# Patient Record
Sex: MALE | Race: WHITE | NOT HISPANIC OR LATINO | Employment: OTHER | URBAN - METROPOLITAN AREA
[De-identification: names, ages, dates, MRNs, and addresses within clinical notes are randomized per-mention and may not be internally consistent; named-entity substitution may affect disease eponyms.]

---

## 2017-02-03 RX ORDER — INSULIN DETEMIR 100 [IU]/ML
INJECTION, SOLUTION SUBCUTANEOUS
Qty: 100 ML | Refills: 3 | Status: SHIPPED | OUTPATIENT
Start: 2017-02-03 | End: 2017-10-10 | Stop reason: DRUGHIGH

## 2017-02-13 ENCOUNTER — OFFICE VISIT (OUTPATIENT)
Dept: NEUROLOGY | Facility: CLINIC | Age: 72
End: 2017-02-13

## 2017-02-13 VITALS — WEIGHT: 315 LBS | BODY MASS INDEX: 36.45 KG/M2 | HEIGHT: 78 IN

## 2017-02-13 DIAGNOSIS — R51.9 CHRONIC INTRACTABLE HEADACHE, UNSPECIFIED HEADACHE TYPE: ICD-10-CM

## 2017-02-13 DIAGNOSIS — R41.3 MEMORY IMPAIRMENT: ICD-10-CM

## 2017-02-13 DIAGNOSIS — G89.29 CHRONIC INTRACTABLE HEADACHE, UNSPECIFIED HEADACHE TYPE: ICD-10-CM

## 2017-02-13 PROCEDURE — 99213 OFFICE O/P EST LOW 20 MIN: CPT | Performed by: PSYCHIATRY & NEUROLOGY

## 2017-02-13 NOTE — PROGRESS NOTES
Subjective   Dirk Atkins is a 71 y.o. male with history of memory impairment and headaches came for follow-up appointment.    History of Present Illness   He was accompanied by his wife and according to them, he has any changes in his memory and denies forgetting familiar names and patient is currently driving and denies any issues with driving but having depression issues and complaint with the medication citalopram and denies any side effects.  Denies any resting tremor, cogwheel rigidity, shuffling gait or hallucinations.  Complaining of headaches and happening 2-3 times a month, pain all over, dull pain, not associated with nausea, photophobia and phonophobia and complaint with the medication gabapentin and denies any side effects.  Denies any blurred vision, double vision, weakness, tingling numbness, dizziness or balance problems while walking.  Denies any falls, passing out spells or recent hospitalizations since last visit.    The following portions of the patient's history were reviewed and updated as appropriate: allergies, current medications, past family history, past medical history, past social history, past surgical history and problem list.    Review of Systems   Constitutional: Negative for chills, fatigue and fever.   HENT: Positive for hearing loss. Negative for tinnitus and trouble swallowing.    Eyes: Negative for pain, redness, itching and visual disturbance.   Respiratory: Positive for shortness of breath. Negative for wheezing.    Cardiovascular: Negative for chest pain and palpitations.   Gastrointestinal: Negative for constipation, diarrhea, nausea and vomiting.   Endocrine: Negative for cold intolerance and heat intolerance.   Genitourinary: Negative for decreased urine volume, difficulty urinating and urgency.   Musculoskeletal: Negative for neck pain and neck stiffness.   Skin: Negative for rash and wound.   Allergic/Immunologic: Negative for environmental allergies and food allergies.    Neurological: Positive for headaches. Negative for dizziness, weakness, light-headedness and numbness.   Hematological: Does not bruise/bleed easily.   Psychiatric/Behavioral: Negative for confusion, hallucinations and sleep disturbance.       Objective   Physical Exam   Vitals reviewed.    GENERAL EXAMINATION : Patient is well-developed, well-nourished, well-groomed, alert and approriate in no apparent distress.  HEENT: Normocephalic, normal funduscopic exam, no visible oral lesions.  NECK : Normal range of motion, no tenderness, no malalignment.  CARDIAC : Regular rate and rhythm, no murmurs.  CHEST : Clear to auscultation, bilateral.   No wheezing .  ABDOMEN : Soft, non tender and non distended. EXTREMITIES: No edema. SKIN : No rashes or lesions visible. MUSCULOSKELETAL : No muscle weakness or muscle pain. No joint pains. PSYCHIATRIC : Awake and follwoing verbal commands well.     NEUROLOGICAL EXAMINATION  :  Higher integrative functions : No aphasia or dysarthria.  MMSE 30/30.  CRANIAL NERVES : Cranial nerve II: Normal visual acuity and visual fields.  On funduscopic exam, discs are flat with sharp margins cranial nerves III, IV, VI: Extraocular movements are full without nystagmus; pupils are equal, round and reactive to light.  Cranial nerve V: Normal facial sensation and strength of muscles of mastication.  Cranial nerve: VII: Facial movements are symmetric.  No weakness.  Cranial nerve VIII: Auditory acuity is normal.  Cranial nerve IX, X: Symmetric palate movement.  Cranial nerve XI sternocleidomastoid and trapezius are normal.  Cranial nerve XII: Tongue in the midline with no atrophy or fasciculations.      MOTOR : Normal muscle strength and tone.  SENSATION : Normal to light touch, pinprick, vibration sensations intact and Romberg is negative.  MUSCLE STRETCH REFLEXES : Normal and symmetric in the upper extremities and lower extremities and the plantar responses are flexor bilaterally. COORDINATION :  Finger to nose test showed no dysmetria.  Rapid alternating movements are normal.   GAIT : Walks on heels, toes, except for difficulty with tandem walk.    Assessment/Plan   Dirk was seen today for memory loss.    Diagnoses and all orders for this visit:    Memory impairment    Chronic intractable headache, unspecified headache type     71-year-old right-handed white male with history of memory impairment with headaches came for follow-up appointment.  On exam, no new focal deficits were seen except for difficulty with tandem walk and MMSE 30/30.  Reviewed neuropsychological evaluation dated June 2016 and did not any evidence of dementia but showed major depression and discussed with the patient and his wife regarding his signs and symptoms and told him to continue vitamin B12 at the same dosage and discussed about side effects.  Discussed about brain exercises to help with the memory issues.  Told him to continue citalopram to help with the depression issues.  Continue gabapentin 300 mg by mouth 3 times a day and discussed about side effects.  Reviewed sleep study and showed evidence of mild issues but denies any major sleep apnea.  Discussed about medication or use headaches and told him not to use over-the-counter medication as it can cause rebound headaches.  Discussed about fall precautions and told him to continue physical activity and will consider physical therapy in future for worsening of balance issues.  Will follow-up in 6 months or earlier if problems arise.    Thank you for allowing me to participate in the care of the patient.  Please feel free to call for any questions at your convenience.    Yours sincerely,    Genie Mendoza M.D

## 2017-02-23 RX ORDER — SIMVASTATIN 40 MG
TABLET ORAL
Qty: 90 TABLET | Refills: 0 | Status: SHIPPED | OUTPATIENT
Start: 2017-02-23 | End: 2017-04-27 | Stop reason: SDUPTHER

## 2017-02-24 DIAGNOSIS — N40.0 ENLARGED PROSTATE WITHOUT LOWER URINARY TRACT SYMPTOMS (LUTS): ICD-10-CM

## 2017-02-24 RX ORDER — TAMSULOSIN HYDROCHLORIDE 0.4 MG/1
1 CAPSULE ORAL DAILY
Qty: 90 CAPSULE | Refills: 0 | Status: SHIPPED | OUTPATIENT
Start: 2017-02-24 | End: 2017-04-28 | Stop reason: SDUPTHER

## 2017-03-20 ENCOUNTER — OFFICE VISIT (OUTPATIENT)
Dept: INTERNAL MEDICINE | Age: 72
End: 2017-03-20

## 2017-03-20 VITALS
BODY MASS INDEX: 36.45 KG/M2 | DIASTOLIC BLOOD PRESSURE: 60 MMHG | RESPIRATION RATE: 12 BRPM | SYSTOLIC BLOOD PRESSURE: 110 MMHG | HEART RATE: 72 BPM | WEIGHT: 315 LBS | HEIGHT: 78 IN

## 2017-03-20 DIAGNOSIS — R35.1 NOCTURIA ASSOCIATED WITH BENIGN PROSTATIC HYPERTROPHY: ICD-10-CM

## 2017-03-20 DIAGNOSIS — N40.1 NOCTURIA ASSOCIATED WITH BENIGN PROSTATIC HYPERTROPHY: ICD-10-CM

## 2017-03-20 DIAGNOSIS — E11.9 TYPE 2 DIABETES MELLITUS WITHOUT COMPLICATION, WITHOUT LONG-TERM CURRENT USE OF INSULIN (HCC): ICD-10-CM

## 2017-03-20 DIAGNOSIS — I10 ESSENTIAL HYPERTENSION: Primary | ICD-10-CM

## 2017-03-20 DIAGNOSIS — E78.2 MIXED HYPERLIPIDEMIA: ICD-10-CM

## 2017-03-20 DIAGNOSIS — E53.8 B12 DEFICIENCY: ICD-10-CM

## 2017-03-20 LAB
ALBUMIN SERPL-MCNC: 4.7 G/DL (ref 3.5–5.2)
ALBUMIN/GLOB SERPL: 2.1 G/DL
ALP SERPL-CCNC: 72 U/L (ref 39–117)
ALT SERPL-CCNC: 13 U/L (ref 1–41)
AST SERPL-CCNC: 14 U/L (ref 1–40)
BILIRUB SERPL-MCNC: 0.7 MG/DL (ref 0.1–1.2)
BUN SERPL-MCNC: 10 MG/DL (ref 8–23)
BUN/CREAT SERPL: 10.8 (ref 7–25)
CALCIUM SERPL-MCNC: 9.7 MG/DL (ref 8.6–10.5)
CHLORIDE SERPL-SCNC: 97 MMOL/L (ref 98–107)
CHOLEST SERPL-MCNC: 153 MG/DL (ref 0–200)
CO2 SERPL-SCNC: 22.9 MMOL/L (ref 22–29)
CREAT SERPL-MCNC: 0.93 MG/DL (ref 0.76–1.27)
GLOBULIN SER CALC-MCNC: 2.2 GM/DL
GLUCOSE SERPL-MCNC: 209 MG/DL (ref 65–99)
HBA1C MFR BLD: 8.2 % (ref 4.8–5.6)
HDLC SERPL-MCNC: 30 MG/DL (ref 40–60)
LDLC SERPL CALC-MCNC: 85 MG/DL (ref 0–100)
POTASSIUM SERPL-SCNC: 5.3 MMOL/L (ref 3.5–5.2)
PROT SERPL-MCNC: 6.9 G/DL (ref 6–8.5)
PSA SERPL-MCNC: 0.06 NG/ML (ref 0–4)
SODIUM SERPL-SCNC: 135 MMOL/L (ref 136–145)
TRIGL SERPL-MCNC: 189 MG/DL (ref 0–150)
VIT B12 SERPL-MCNC: 1259 PG/ML (ref 211–946)
VLDLC SERPL CALC-MCNC: 37.8 MG/DL (ref 5–40)

## 2017-03-20 PROCEDURE — 99214 OFFICE O/P EST MOD 30 MIN: CPT | Performed by: INTERNAL MEDICINE

## 2017-03-20 NOTE — PROGRESS NOTES
Dirk Atkins is a 71 y.o. male who presents with   Chief Complaint   Patient presents with   • Hypertension     Checkup; lab updates   • Hyperlipidemia     As above   • Diabetes     As above.  Patient's weight has increased from 334 pounds in February to his current weight of 343 pounds.  At his height of 6 feet 6 it is estimated his ideal weight is somewhere around 225 pounds.   • B12 deficiency   .    Hypertension   This is a chronic problem. The current episode started more than 1 year ago. The problem is controlled. Past treatments include beta blockers and ACE inhibitors. The current treatment provides moderate improvement. There are no compliance problems.    Hyperlipidemia   This is a chronic problem. The current episode started more than 1 year ago. Recent lipid tests were reviewed and are high (Elevation of triglycerides from recent past labs.). Current antihyperlipidemic treatment includes statins. The current treatment provides moderate improvement of lipids. There are no compliance problems.    Diabetes   He presents for his follow-up diabetic visit. He has type 2 diabetes mellitus. His disease course has been stable. There are no hypoglycemic complications. Symptoms are stable. There are no diabetic complications. He does not see a podiatrist (No symptoms of diabetic peripheral neuropathy.).Eye exam current: Has an appointment at the [facility] in the next several days.        The following portions of the patient's history were reviewed and updated as appropriate: allergies, current medications, past medical history and problem list.    Review of Systems   Constitutional: Negative.    HENT: Negative.    Eyes: Negative.    Respiratory: Negative.    Cardiovascular: Negative.    Genitourinary: Negative.    Musculoskeletal: Negative.    Skin: Negative.    Neurological: Negative.    Psychiatric/Behavioral: Negative.        Objective   Physical Exam   Constitutional: He is oriented to person,  place, and time. He appears well-developed and well-nourished. No distress.   HENT:   Head: Normocephalic and atraumatic.   Eyes: Conjunctivae and EOM are normal. Pupils are equal, round, and reactive to light.   Neck: Normal range of motion. Neck supple. No thyromegaly present.   Neck exam negative.  Carotid auscultation normal-no bruits heard.   Cardiovascular: Normal rate, regular rhythm, normal heart sounds and intact distal pulses.  Exam reveals no gallop and no friction rub.    No murmur heard.  Pulmonary/Chest: Effort normal and breath sounds normal. No respiratory distress. He has no wheezes. He has no rales. He exhibits no tenderness.   Neurological: He is alert and oriented to person, place, and time.   Psychiatric: He has a normal mood and affect. His behavior is normal. Judgment and thought content normal.   Nursing note and vitals reviewed.      Assessment/Plan   Dirk was seen today for hypertension, hyperlipidemia, diabetes and b12 deficiency.    Diagnoses and all orders for this visit:    Essential hypertension  -     Comprehensive Metabolic Panel    Mixed hyperlipidemia  -     Comprehensive Metabolic Panel  -     Lipid Panel    Type 2 diabetes mellitus without complication, without long-term current use of insulin  -     Comprehensive Metabolic Panel  -     Hemoglobin A1c    B12 deficiency  -     Vitamin B12    Nocturia associated with benign prostatic hypertrophy  -     PSA        Plan: Labs as above.Today's exam unremarkable for any new problems or events.  Continue all current treatment as prescribed.  A follow-up checkup/lab update visit is advised for 6 months.

## 2017-04-24 DIAGNOSIS — E11.9 TYPE 2 DIABETES MELLITUS WITHOUT COMPLICATION, WITHOUT LONG-TERM CURRENT USE OF INSULIN (HCC): Primary | ICD-10-CM

## 2017-04-24 RX ORDER — BLOOD-GLUCOSE METER
EACH MISCELLANEOUS
Qty: 1 EACH | Refills: 0 | Status: SHIPPED | OUTPATIENT
Start: 2017-04-24 | End: 2018-01-22 | Stop reason: CLARIF

## 2017-04-24 RX ORDER — BLOOD SUGAR DIAGNOSTIC
STRIP MISCELLANEOUS
Qty: 200 EACH | Refills: 12 | Status: SHIPPED | OUTPATIENT
Start: 2017-04-24 | End: 2018-01-22 | Stop reason: CLARIF

## 2017-04-27 DIAGNOSIS — E78.2 MIXED HYPERLIPIDEMIA: Primary | ICD-10-CM

## 2017-04-27 RX ORDER — SIMVASTATIN 40 MG
40 TABLET ORAL NIGHTLY
Qty: 90 TABLET | Refills: 1 | Status: SHIPPED | OUTPATIENT
Start: 2017-04-27 | End: 2018-01-02 | Stop reason: SDUPTHER

## 2017-04-28 DIAGNOSIS — N40.0 ENLARGED PROSTATE WITHOUT LOWER URINARY TRACT SYMPTOMS (LUTS): ICD-10-CM

## 2017-04-28 RX ORDER — TAMSULOSIN HYDROCHLORIDE 0.4 MG/1
CAPSULE ORAL
Qty: 90 CAPSULE | Refills: 1 | Status: SHIPPED | OUTPATIENT
Start: 2017-04-28 | End: 2018-01-09 | Stop reason: SDUPTHER

## 2017-06-02 RX ORDER — FINASTERIDE 5 MG/1
TABLET, FILM COATED ORAL
Qty: 90 TABLET | Refills: 1 | Status: SHIPPED | OUTPATIENT
Start: 2017-06-02 | End: 2017-06-05 | Stop reason: SDUPTHER

## 2017-06-06 RX ORDER — FINASTERIDE 5 MG/1
TABLET, FILM COATED ORAL
Qty: 90 TABLET | Refills: 1 | Status: SHIPPED | OUTPATIENT
Start: 2017-06-06 | End: 2018-02-10 | Stop reason: SDUPTHER

## 2017-06-09 ENCOUNTER — TELEPHONE (OUTPATIENT)
Dept: INTERNAL MEDICINE | Age: 72
End: 2017-06-09

## 2017-06-09 DIAGNOSIS — F39 MOOD DISORDER (HCC): ICD-10-CM

## 2017-06-09 RX ORDER — CITALOPRAM 20 MG/1
20 TABLET ORAL DAILY
Qty: 90 TABLET | Refills: 0 | Status: SHIPPED | OUTPATIENT
Start: 2017-06-09 | End: 2017-10-17 | Stop reason: SDUPTHER

## 2017-06-09 NOTE — TELEPHONE ENCOUNTER
Pt  Called requesting a rx to be sent to [facility] mail order for Citalopram 20mg 1 QD qty 90, with 3 refills.

## 2017-07-10 DIAGNOSIS — I10 ESSENTIAL HYPERTENSION: ICD-10-CM

## 2017-09-12 DIAGNOSIS — I10 ESSENTIAL HYPERTENSION: ICD-10-CM

## 2017-09-14 ENCOUNTER — OFFICE VISIT (OUTPATIENT)
Dept: INTERNAL MEDICINE | Age: 72
End: 2017-09-14

## 2017-09-14 VITALS
RESPIRATION RATE: 12 BRPM | HEART RATE: 70 BPM | DIASTOLIC BLOOD PRESSURE: 60 MMHG | BODY MASS INDEX: 36.45 KG/M2 | WEIGHT: 315 LBS | SYSTOLIC BLOOD PRESSURE: 120 MMHG | HEIGHT: 78 IN

## 2017-09-14 DIAGNOSIS — E11.9 TYPE 2 DIABETES MELLITUS WITHOUT COMPLICATION, WITHOUT LONG-TERM CURRENT USE OF INSULIN (HCC): Primary | ICD-10-CM

## 2017-09-14 DIAGNOSIS — I10 ESSENTIAL HYPERTENSION: ICD-10-CM

## 2017-09-14 DIAGNOSIS — E78.2 MIXED HYPERLIPIDEMIA: ICD-10-CM

## 2017-09-14 PROCEDURE — 99214 OFFICE O/P EST MOD 30 MIN: CPT | Performed by: INTERNAL MEDICINE

## 2017-09-14 NOTE — PROGRESS NOTES
Dirk Atkins is a 72 y.o. male who presents with   Chief Complaint   Patient presents with   • Diabetes     Checkup; lab updates.  Patient reports that his blood sugars consistently are between 200-275 on his current doses of medication including insulin.   • Hypertension     Checkup; lab updates   • Hyperlipidemia     As above.   .    Diabetes   He presents for his follow-up diabetic visit. He has type 2 diabetes mellitus. His disease course has been stable. There are no hypoglycemic associated symptoms. There are no diabetic associated symptoms. There are no hypoglycemic complications. There are no diabetic complications. Current diabetic treatment includes insulin injections and oral agent (monotherapy). He is compliant with treatment most of the time. His weight is increasing steadily. When asked about meal planning, he reported none. There is no change in his home blood glucose trend. His breakfast blood glucose range is generally >200 mg/dl. His dinner blood glucose range is generally >200 mg/dl. An ACE inhibitor/angiotensin II receptor blocker is being taken. Eye exam is current.   Hypertension   This is a chronic problem. The current episode started more than 1 year ago. The problem is controlled. Past treatments include beta blockers and ACE inhibitors. The current treatment provides moderate improvement. There are no compliance problems.    Hyperlipidemia   This is a chronic problem. The current episode started more than 1 year ago. The problem is controlled. Current antihyperlipidemic treatment includes statins. The current treatment provides moderate improvement of lipids. There are no compliance problems.         The following portions of the patient's history were reviewed and updated as appropriate: allergies, current medications, past medical history and problem list.    Review of Systems   Constitutional: Negative.    HENT: Negative.    Eyes: Negative.    Respiratory: Negative.    Cardiovascular:  Negative.    Genitourinary: Negative.    Musculoskeletal: Negative.    Skin: Negative.    Neurological: Negative.    Psychiatric/Behavioral: Negative.        Objective   Physical Exam   Constitutional: He is oriented to person, place, and time. He appears well-developed and well-nourished. No distress.   Obese 72-year-old male whose weight has increased from 334 pounds in February to his current weight of 349 pounds.  He does not seem too terribly concerned about weight loss.  Also it is noted that his labs in March showed a blood sugar of 209 with an A1c of 8.2.  He was advised after that visit and after his labs were seen that he should come in to discuss those lab abnormalities and to decide on a course of action.  He never did comply with that advice.   HENT:   Head: Normocephalic and atraumatic.   Eyes: Conjunctivae and EOM are normal. Pupils are equal, round, and reactive to light.   Neck: Normal range of motion. Neck supple. No thyromegaly present.   Neck exam negative.  Carotid auscultation normal-no bruits heard.   Cardiovascular: Normal rate, regular rhythm, normal heart sounds and intact distal pulses.  Exam reveals no gallop and no friction rub.    No murmur heard.  Pulmonary/Chest: Effort normal and breath sounds normal. No respiratory distress. He has no wheezes. He has no rales. He exhibits no tenderness.   Neurological: He is alert and oriented to person, place, and time.   Psychiatric: He has a normal mood and affect. His behavior is normal. Judgment and thought content normal.   Nursing note and vitals reviewed.      Assessment/Plan   Dirk was seen today for diabetes, hypertension and hyperlipidemia.    Diagnoses and all orders for this visit:    Type 2 diabetes mellitus without complication, without long-term current use of insulin  -     Comprehensive Metabolic Panel  -     Hemoglobin A1c  -     TSH+Free T4  -     MicroAlbumin, Urine, Random    Mixed hyperlipidemia  -     Comprehensive  Metabolic Panel  -     Lipid Panel  -     TSH+Free T4    Essential hypertension  -     Comprehensive Metabolic Panel  -     TSH+Free T4      Plan: Weight loss strongly advised so as to help his medications and his insulin work better for him to lower his blood sugar.    Diabetes: He will increase his morning dose of Levemir to 30 units at 7 AM.  He will continue his 7 PM dose of Levemir at 40 units.  He will continue to monitor his blood sugars at home and we will see him in 3 weeks for a reevaluation of his blood sugar.  All of his other medications as listed will be continued as they are for now.    Hypertension/hyperlipidemia: Continue current treatment.  Tentatively we will plan on a six-month checkup/lab update visit to assess these and the rest of his labs but much of this will depend on what his blood sugar does with the increased dose of insulin as above.

## 2017-09-15 LAB
ALBUMIN SERPL-MCNC: 4.4 G/DL (ref 3.5–5.2)
ALBUMIN/GLOB SERPL: 2.1 G/DL
ALP SERPL-CCNC: 62 U/L (ref 39–117)
ALT SERPL-CCNC: 14 U/L (ref 1–41)
AST SERPL-CCNC: 12 U/L (ref 1–40)
BILIRUB SERPL-MCNC: 0.8 MG/DL (ref 0.1–1.2)
BUN SERPL-MCNC: 13 MG/DL (ref 8–23)
BUN/CREAT SERPL: 13.5 (ref 7–25)
CALCIUM SERPL-MCNC: 9.4 MG/DL (ref 8.6–10.5)
CHLORIDE SERPL-SCNC: 102 MMOL/L (ref 98–107)
CHOLEST SERPL-MCNC: 140 MG/DL (ref 0–200)
CO2 SERPL-SCNC: 28.7 MMOL/L (ref 22–29)
CREAT SERPL-MCNC: 0.96 MG/DL (ref 0.76–1.27)
GLOBULIN SER CALC-MCNC: 2.1 GM/DL
GLUCOSE SERPL-MCNC: 167 MG/DL (ref 65–99)
HBA1C MFR BLD: 7.79 % (ref 4.8–5.6)
HDLC SERPL-MCNC: 33 MG/DL (ref 40–60)
LDLC SERPL CALC-MCNC: 74 MG/DL (ref 0–100)
MICROALBUMIN UR-MCNC: 88.5 UG/ML
POTASSIUM SERPL-SCNC: 5.3 MMOL/L (ref 3.5–5.2)
PROT SERPL-MCNC: 6.5 G/DL (ref 6–8.5)
SODIUM SERPL-SCNC: 141 MMOL/L (ref 136–145)
T4 FREE SERPL-MCNC: 0.97 NG/DL (ref 0.93–1.7)
TRIGL SERPL-MCNC: 164 MG/DL (ref 0–150)
TSH SERPL DL<=0.005 MIU/L-ACNC: 1.04 MIU/ML (ref 0.27–4.2)
VLDLC SERPL CALC-MCNC: 32.8 MG/DL (ref 5–40)

## 2017-10-10 ENCOUNTER — OFFICE VISIT (OUTPATIENT)
Dept: INTERNAL MEDICINE | Age: 72
End: 2017-10-10

## 2017-10-10 VITALS
SYSTOLIC BLOOD PRESSURE: 110 MMHG | DIASTOLIC BLOOD PRESSURE: 60 MMHG | WEIGHT: 315 LBS | HEART RATE: 72 BPM | HEIGHT: 78 IN | RESPIRATION RATE: 12 BRPM | BODY MASS INDEX: 36.45 KG/M2

## 2017-10-10 DIAGNOSIS — E11.9 TYPE 2 DIABETES MELLITUS WITHOUT COMPLICATION, UNSPECIFIED LONG TERM INSULIN USE STATUS: Primary | ICD-10-CM

## 2017-10-10 PROCEDURE — 99214 OFFICE O/P EST MOD 30 MIN: CPT | Performed by: INTERNAL MEDICINE

## 2017-10-10 NOTE — PROGRESS NOTES
"  Dirk Atkins is a 72 y.o. male who presents with   Chief Complaint   Patient presents with   • Diabetes     Currently using Levemir 30 units in the morning before breakfast and 40 units in the evening before supper.  His morning blood sugars are ranging between 130-200; his 4 hour post lunch blood sugars range between 140-330; and at suppertime his blood sugars range between 120-240.  The morning average is 195.  The lunchtime average is 223.  The evening average is 173.  His weight has increased from 343 pounds in March to 356 pounds now.  He is admittedly noncompliant with diet and exercise and admits that    .  Continuing the dictation from above.. and he admits that he is not likely to become compliant with either one (diet and exercise).  He realizes that diabetes is a potentially fatal problem and laughingly admits that Dr. Owusu has told him in the past that he is \"killing himself with a knife and fork\".    Diabetes   He presents for his follow-up diabetic visit. He has type 2 diabetes mellitus. His disease course has been stable. There are no hypoglycemic associated symptoms. There are no diabetic associated symptoms. There are no hypoglycemic complications. Current diabetic treatment includes oral agent (monotherapy) and insulin injections. He is compliant with treatment most of the time. His weight is increasing rapidly. He is following a generally unhealthy diet. When asked about meal planning, he reported none. Prior visit with dietitian: Patient is not interested.  He says his wife is an RN and \"gets on me all the time about eating poorly and not exercising\" He never participates in exercise. His home blood glucose trend is fluctuating dramatically. An ACE inhibitor/angiotensin II receptor blocker is being taken. Sees podiatrist: Declines.  Says he does not have any symptoms of tingling in his feet.Eye exam is current.        The following portions of the patient's history were reviewed and updated " as appropriate: allergies, current medications, past medical history and problem list.    Review of Systems   Constitutional: Negative.    HENT: Negative.    Eyes: Negative.    Respiratory: Negative.    Cardiovascular: Negative.    Genitourinary: Negative.    Musculoskeletal: Negative.    Skin: Negative.    Neurological: Negative.    Psychiatric/Behavioral: Negative.        Objective   Physical Exam   Constitutional: He is oriented to person, place, and time. He appears well-developed and well-nourished. No distress.   Morbid obesity as noted.  The patient has no inclination is to lose weight although I did mention to him that he might strongly consider the weight watchers program.   HENT:   Head: Normocephalic and atraumatic.   Eyes: Conjunctivae and EOM are normal. Pupils are equal, round, and reactive to light.   Neck: Normal range of motion. Neck supple. No thyromegaly present.   Neck exam negative.  Carotid auscultation normal-no bruits heard.   Cardiovascular: Normal rate, regular rhythm, normal heart sounds and intact distal pulses.  Exam reveals no gallop and no friction rub.    No murmur heard.  Pulmonary/Chest: Effort normal and breath sounds normal. No respiratory distress. He has no wheezes. He has no rales. He exhibits no tenderness.   Neurological: He is alert and oriented to person, place, and time.   Psychiatric: He has a normal mood and affect. His behavior is normal. Judgment and thought content normal.   Nursing note and vitals reviewed.      Assessment/Plan   Dirk was seen today for diabetes.    Diagnoses and all orders for this visit:    Type 2 diabetes mellitus without complication, unspecified long term insulin use status      Plan: The patient's noncompliance with diet/exercise makes it very difficult to get any semblance of diabetic control.  We will try increasing his Levemir to 40 units in the morning before breakfast and will continue his 40 units in the evening before supper.  All  other medications will he continued as prescribed.  A follow-up checkup to review blood sugars has been advised for one month.

## 2017-10-17 DIAGNOSIS — I10 ESSENTIAL HYPERTENSION: ICD-10-CM

## 2017-10-17 DIAGNOSIS — F39 MOOD DISORDER (HCC): ICD-10-CM

## 2017-10-17 DIAGNOSIS — E11.9 TYPE 2 DIABETES MELLITUS WITHOUT COMPLICATION, UNSPECIFIED LONG TERM INSULIN USE STATUS: Primary | ICD-10-CM

## 2017-10-17 RX ORDER — CITALOPRAM 20 MG/1
20 TABLET ORAL DAILY
Qty: 90 TABLET | Refills: 0 | Status: SHIPPED | OUTPATIENT
Start: 2017-10-17 | End: 2018-01-09 | Stop reason: SDUPTHER

## 2017-10-23 DIAGNOSIS — I10 ESSENTIAL HYPERTENSION: ICD-10-CM

## 2017-10-23 DIAGNOSIS — I25.10 CORONARY ARTERIOSCLEROSIS: ICD-10-CM

## 2017-10-23 RX ORDER — ISOSORBIDE MONONITRATE 30 MG/1
TABLET, EXTENDED RELEASE ORAL
Qty: 90 TABLET | Refills: 0 | Status: SHIPPED | OUTPATIENT
Start: 2017-10-23 | End: 2018-02-10 | Stop reason: SDUPTHER

## 2017-11-10 ENCOUNTER — OFFICE VISIT (OUTPATIENT)
Dept: INTERNAL MEDICINE | Age: 72
End: 2017-11-10

## 2017-11-10 VITALS
WEIGHT: 315 LBS | BODY MASS INDEX: 36.45 KG/M2 | HEART RATE: 70 BPM | HEIGHT: 78 IN | OXYGEN SATURATION: 94 % | TEMPERATURE: 95.6 F | SYSTOLIC BLOOD PRESSURE: 110 MMHG | DIASTOLIC BLOOD PRESSURE: 60 MMHG | RESPIRATION RATE: 12 BRPM

## 2017-11-10 DIAGNOSIS — I10 ESSENTIAL HYPERTENSION: ICD-10-CM

## 2017-11-10 DIAGNOSIS — E11.9 TYPE 2 DIABETES MELLITUS WITHOUT COMPLICATION, UNSPECIFIED LONG TERM INSULIN USE STATUS: Primary | ICD-10-CM

## 2017-11-10 PROCEDURE — 99214 OFFICE O/P EST MOD 30 MIN: CPT | Performed by: INTERNAL MEDICINE

## 2017-11-10 NOTE — PROGRESS NOTES
"  Dirk Atkins is a 72 y.o. male who presents with   Chief Complaint   Patient presents with   • Diabetes     Patient returns for follow-up blood sugar reevaluation after having increased his Levemir to 40 units twice a day.  He continues to take metformin 850 mg twice a day.  His blood sugars are ranging at home between 120-300 consistently from his morning to evening readings.  He also says that on occasion \"when I eat I pass out\".   • Hypertension     Check blood pressure.   .    Diabetes   He presents for his follow-up diabetic visit. He has type 2 diabetes mellitus. His disease course has been fluctuating. (Patient reports that on occasion he \"passes out after eating\".) Hypoglycemia complications include blackouts. Current diabetic treatment includes oral agent (monotherapy) and insulin injections. He is compliant with treatment most of the time. His weight is decreasing steadily. He is following a generally unhealthy diet. When asked about meal planning, he reported none. He has not had a previous visit with a dietitian. He never participates in exercise. An ACE inhibitor/angiotensin II receptor blocker is being taken.   Hypertension   This is a chronic problem. The current episode started more than 1 year ago. The problem is controlled. Past treatments include beta blockers and ACE inhibitors. The current treatment provides moderate improvement. There are no compliance problems.         The following portions of the patient's history were reviewed and updated as appropriate: allergies, current medications, past medical history and problem list.    Review of Systems   Constitutional: Negative.    HENT: Negative.    Eyes: Negative.    Respiratory: Negative.    Cardiovascular: Negative.    Genitourinary: Negative.    Musculoskeletal: Negative.    Skin: Negative.    Neurological: Negative.    Psychiatric/Behavioral: Negative.        Objective   Physical Exam   Constitutional: He is oriented to person, place, and " "time. He appears well-developed and well-nourished. No distress.   HENT:   Head: Normocephalic and atraumatic.   Eyes: Conjunctivae and EOM are normal. Pupils are equal, round, and reactive to light.   Neck: Normal range of motion. Neck supple. No thyromegaly present.   Neck exam negative.  Carotid auscultation normal-no bruits heard.   Cardiovascular: Normal rate, regular rhythm, normal heart sounds and intact distal pulses.  Exam reveals no gallop and no friction rub.    No murmur heard.  Pulmonary/Chest: Effort normal and breath sounds normal. No respiratory distress. He has no wheezes. He has no rales. He exhibits no tenderness.   Neurological: He is alert and oriented to person, place, and time.   Psychiatric: He has a normal mood and affect. His behavior is normal. Judgment and thought content normal.   Nursing note and vitals reviewed.      Assessment/Plan   Dirk was seen today for diabetes and hypertension.    Diagnoses and all orders for this visit:    Type 2 diabetes mellitus without complication, unspecified long term insulin use status  -     Ambulatory Referral to Endocrinology    Essential hypertension      Plan:  #1 diabetes: Poorly controlled diabetes on current treatment.  Patient is admittedly noncompliant with any dietary efforts and states that he is \"not likely to change my diet\".  At this point I feel referral to endocrinology is in order and hopefully better control can be achieved.  Patient advised that future lab testing for sugar, lipids etc. likely will be done through endocrinology and that he may not need to have the frequency of lab testings through my office that he has had in the past.    #2 hypertension: Blood pressure under reasonably good control with his current treatment.  Continue the same.  Follow-up blood pressure checkup in 4 months advised.         "

## 2017-11-16 ENCOUNTER — TELEPHONE (OUTPATIENT)
Dept: INTERNAL MEDICINE | Age: 72
End: 2017-11-16

## 2017-11-16 NOTE — TELEPHONE ENCOUNTER
"Agree with the assessment.  It is difficult to tell whether he has \"the flu\" or whether he might have pneumonia with flulike symptoms.  Given his overall medical condition I feel an ER evaluation would be most appropriate under these circumstances.  "

## 2017-11-16 NOTE — TELEPHONE ENCOUNTER
----- Message from Yony Hyman sent at 11/16/2017  8:40 AM EST -----  Wife called and said that her  has the flu, he has had it for about a week, she called in this morning around 8:40AM and wanted him to be seen by Dr Tyler.  I ask her his symptoms and she said he had chillis, very congested and he has heart problems, but he had a hard time breathing last night.  I spoke with Angie and she advised that he needed to go to the ER.  I relayed the msg back to the wife and she question why he needed to go to the ER.  I explained that a chest x ray may need to be done as well as he may need fluids.

## 2017-11-20 DIAGNOSIS — I10 ESSENTIAL HYPERTENSION: ICD-10-CM

## 2017-11-29 ENCOUNTER — OFFICE VISIT (OUTPATIENT)
Dept: ENDOCRINOLOGY | Age: 72
End: 2017-11-29

## 2017-11-29 VITALS — HEIGHT: 78 IN | WEIGHT: 315 LBS | BODY MASS INDEX: 36.45 KG/M2

## 2017-11-29 DIAGNOSIS — E67.3 HYPERVITAMINOSIS D: ICD-10-CM

## 2017-11-29 DIAGNOSIS — R53.82 CHRONIC FATIGUE: ICD-10-CM

## 2017-11-29 DIAGNOSIS — Z79.4 TYPE 2 DIABETES MELLITUS WITH HYPERGLYCEMIA, WITH LONG-TERM CURRENT USE OF INSULIN (HCC): Primary | ICD-10-CM

## 2017-11-29 DIAGNOSIS — R41.3 MEMORY IMPAIRMENT: ICD-10-CM

## 2017-11-29 DIAGNOSIS — E78.2 MIXED HYPERLIPIDEMIA: ICD-10-CM

## 2017-11-29 DIAGNOSIS — E11.65 TYPE 2 DIABETES MELLITUS WITH HYPERGLYCEMIA, WITH LONG-TERM CURRENT USE OF INSULIN (HCC): Primary | ICD-10-CM

## 2017-11-29 DIAGNOSIS — I10 ESSENTIAL HYPERTENSION: ICD-10-CM

## 2017-11-29 PROCEDURE — 99204 OFFICE O/P NEW MOD 45 MIN: CPT | Performed by: NURSE PRACTITIONER

## 2017-11-29 NOTE — PATIENT INSTRUCTIONS
Stop Metformin 850 mg twice daily and stop Levemir 40 units twice daily    Start Tresiba U200 insulin at 60 units in the morning increase 1 unit daily if fasting blood glucoses over 110 mg/Brittany    Start Victoza 18 mg/3 ML titrate as instructed 1 injection daily    Start Synjardy 5/1000mg  by mouth twice daily with this medication take over-the-counter vitamin C at least 500 mg daily and vitamin D at least at thousand IUs daily increase water at least 3-4 bottles daily especially the first 3-4 weeks while starting this medication    home blood tests -  Blood glucose testin times daily, that are: fasting- 1st thing in morning before eating or drinking, before each meal and 1 or 2 hours after meal  Bedtime, anytime you feel symptoms of hyperglycemia or hypoglycemia (high or low blood sugars)

## 2017-11-29 NOTE — PROGRESS NOTES
Dirk Atkins who presents for for evaluation and treatment of Type 2 diabetes mellitus insulin dependent. The initial diagnosis of diabetes was made 13 years ago.     The condition of diabetes location is system with the course being clinical course has fluctuated , the severity is Moderate , and the modifying/allievating factors are  insulin. Insulin dosage review with Dirk Atkins suggested compliance most of the time   .       Associated symptoms of hyperglycemia have been pass out.  Associated symptoms of hypoglycemia have been none. Patient reports  hypoglycemia threshold for symptoms is n/a mg/dl .       The patient is currently taking home blood tests - Blood glucose testing: 3 times daily, that are:  fasting- 1st thing in morning before eating or drinking  before lunch and after supper   basal insulIn  Levemir U100 40 units in AM  Levemir U100 40 units in PM     Compliance with blood glucose monitoring: good.     Exercise:intermittently with meal panning: The patient is using no plan.    Home blood glucose testing daily: 3  FB to 196 mg/dl  before lunch 127 to 273 mg/dl   after dinner/supper 130 to 253 mg/dl    Patterns reported per patient are that he doesn't feel well because his BS are fluctuating so greatly. After meals - sleepy/pass out and BG goes to 250mg/dl or higher.       The following portions of the patient's history were reviewed and updated as appropriate: allergies, current medications, past family history, past medical history, past social history, past surgical history and problem list.        Medical records, chart, and labs reviewed from primary care provider.      Current Outpatient Prescriptions:   •  aspirin 325 MG tablet, Take 325 mg by mouth daily., Disp: , Rfl:   •  Blood Glucose Calibration (ACCU-CHEK SMARTVIEW CONTROL VI), 2 (two) times a day., Disp: , Rfl:   •  Blood Glucose Monitoring Suppl (PRODIGY AUTOCODE BLOOD GLUCOSE) W/DEVICE kit, Use to test blood sugar twice  daily., Disp: 1 each, Rfl: 0  •  citalopram (CeleXA) 20 MG tablet, Take 1 tablet by mouth Daily., Disp: 90 tablet, Rfl: 0  •  finasteride (PROSCAR) 5 MG tablet, TAKE 1 TABLET BY MOUTH DAILY., Disp: 90 tablet, Rfl: 1  •  insulin detemir (LEVEMIR) 100 UNIT/ML injection, Take 40 units before breakfast and 40 units before supper, Disp: 100 mL, Rfl: 3  •  isosorbide mononitrate (IMDUR) 30 MG 24 hr tablet, TAKE 1 TABLET EVERY DAY, Disp: 90 tablet, Rfl: 0  •  lisinopril (PRINIVIL,ZESTRIL) 2.5 MG tablet, 2.5 mg daily., Disp: , Rfl:   •  metoprolol tartrate (LOPRESSOR) 25 MG tablet, TAKE 1 TABLET TWICE DAILY, Disp: 180 tablet, Rfl: 0  •  NOVOFINE 32G X 6 MM misc, USE WITH INSULIN PEN AS DIRECTED, Disp: 100 each, Rfl: 3  •  PRODIGY NO CODING BLOOD GLUC test strip, Use to test blood sugar twice daily., Disp: 200 each, Rfl: 12  •  simvastatin (ZOCOR) 40 MG tablet, Take 1 tablet by mouth Every Night., Disp: 90 tablet, Rfl: 1  •  tamsulosin (FLOMAX) 0.4 MG capsule 24 hr capsule, TAKE 1 CAPSULE EVERY DAY, Disp: 90 capsule, Rfl: 1  •  Empagliflozin-Metformin HCl (SYNJARDY) 5-1000 MG tablet, Take 5 mg by mouth 2 (Two) Times a Day., Disp: 60 tablet, Rfl: 4  •  Insulin Degludec 200 UNIT/ML solution pen-injector, Inject 60 Units under the skin Daily. Titrate as instructed with max of 100 units daily, Disp: 9 pen, Rfl: 4  •  Liraglutide (VICTOZA) 18 MG/3ML solution pen-injector injection, Victoza 1.8mg SC daily - start .6mg SC daily x 1 week, then 1.2mg SC daily x1 week then maintain at 1.8 mg SC daily, Disp: 3 pen, Rfl: 3    Patient Active Problem List    Diagnosis   • Chronic fatigue [R53.82]   • Hypersomnia [G47.10]   • Myocardial infarct, old [I25.2]   • Chronic tension headache [G44.229]     Overview Note:     Takes Gagapentin--Sees Neuro.     • B12 deficiency [E53.8]     Overview Note:     Currently using over-the-counter B12 supplements.     • Acute conjunctivitis [H10.30]   • Anxiety [F41.9]   • Coronary arteriosclerosis in  "native artery [I25.10]   • Edema [R60.9]   • Enlarged prostate [N40.0]   • Mixed hyperlipidemia [E78.2]   • Essential hypertension [I10]   • Leukocytosis [D72.829]   • Palpitations [R00.2]   • Type 2 diabetes mellitus with hyperglycemia, with long-term current use of insulin [E11.65, Z79.4]   • Chronic intractable headache [R51]   • Memory impairment [R41.3]   • Obstructive sleep apnea [G47.33]     Overview Note:     Declines C-Pap         Review of Systems  A comprehensive review of the 14 systems was negative except of listed below:  Constitutional: fatigue  Eyes: positive for visual disturbance  Genitourinary:positive for frequency and nocturia  Endocrine: diabetic symptoms including increased fatigue, polydipsia and polyuria  temperature  cold intolerance  hyperglycemia     Objective:     Wt Readings from Last 3 Encounters:   11/29/17 (!) 345 lb (156 kg)   11/10/17 (!) 347 lb (157 kg)   10/10/17 (!) 356 lb (161 kg)     Temp Readings from Last 3 Encounters:   11/10/17 95.6 °F (35.3 °C)   09/19/16 97 °F (36.1 °C)   01/11/16 98 °F (36.7 °C)     BP Readings from Last 3 Encounters:   11/10/17 110/60   10/10/17 110/60   09/14/17 120/60     Pulse Readings from Last 3 Encounters:   11/10/17 70   10/10/17 72   09/14/17 70         Ht 78\" (198.1 cm)  Wt (!) 345 lb (156 kg)  BMI 39.87 kg/m2    General appearance:  alert, cooperative,orientated, , fatigued, nourished\" \"appears stated age and cooperative\" \"NAD   Neck: no carotid bruit, supple, symmetrical, trachea midline and thyroid not enlarged, symmetric, no tenderness/mass/nodules   Thyroid:  no palpable nodule, no tenderness, no enlargement,    Lung:  \"clear to auscultation bilaterally\" \"no abnormal breath sounds  \" effort was normal, no labored breath, no use of accessory muscles.   Heart: regular rate and rhythm, S1, S2 normal, no murmur, click, rub or gallop      Abdomen:  normal bowel sounds- 4 quads, soft non-tender, contour - rounded and contour - obese    " "  Extremities: [extremities normal, atraumatic, no cyanosis or edema\" WNL - gait and station, strength and stability\"          Skin:   Pulses:  warm and dry, no hyperpigmentation, normal skin coloring, or suspicious lesions  2+ and symmetric   Neuro: Alert and oriented x3. Gait normal. Reflexes and motor strength normal and symmetric. Cranial nerves 2-12 and sensation grossly intact.      Psych: behavior - normal, judgement - normal, mood - normal, affect - normal                 Lab Review  Results for orders placed or performed in visit on 09/14/17   Comprehensive Metabolic Panel   Result Value Ref Range    Glucose 167 (H) 65 - 99 mg/dL    BUN 13 8 - 23 mg/dL    Creatinine 0.96 0.76 - 1.27 mg/dL    eGFR Non African Am 77 >60 mL/min/1.73    eGFR African Am 93 >60 mL/min/1.73    BUN/Creatinine Ratio 13.5 7.0 - 25.0    Sodium 141 136 - 145 mmol/L    Potassium 5.3 (H) 3.5 - 5.2 mmol/L    Chloride 102 98 - 107 mmol/L    Total CO2 28.7 22.0 - 29.0 mmol/L    Calcium 9.4 8.6 - 10.5 mg/dL    Total Protein 6.5 6.0 - 8.5 g/dL    Albumin 4.40 3.50 - 5.20 g/dL    Globulin 2.1 gm/dL    A/G Ratio 2.1 g/dL    Total Bilirubin 0.8 0.1 - 1.2 mg/dL    Alkaline Phosphatase 62 39 - 117 U/L    AST (SGOT) 12 1 - 40 U/L    ALT (SGPT) 14 1 - 41 U/L   Hemoglobin A1c   Result Value Ref Range    Hemoglobin A1C 7.79 (H) 4.80 - 5.60 %   Lipid Panel   Result Value Ref Range    Total Cholesterol 140 0 - 200 mg/dL    Triglycerides 164 (H) 0 - 150 mg/dL    HDL Cholesterol 33 (L) 40 - 60 mg/dL    VLDL Cholesterol 32.8 5 - 40 mg/dL    LDL Cholesterol  74 0 - 100 mg/dL   TSH+Free T4   Result Value Ref Range    TSH 1.040 0.270 - 4.200 mIU/mL    Free T4 0.97 0.93 - 1.70 ng/dL   MicroAlbumin, Urine, Random   Result Value Ref Range    Microalbumin, Urine 88.5 Not Estab. ug/mL         Assessment:   Dirk was seen today for diabetes.    Diagnoses and all orders for this visit:    Type 2 diabetes mellitus with hyperglycemia, with long-term current use of " insulin  -     Comprehensive Metabolic Panel  -     C-Peptide  -     Hemoglobin A1c  -     Insulin, Total  -     Microalbumin / Creatinine Urine Ratio - Urine, Clean Catch  -     Uric Acid  -     Vitamin D 25 Hydroxy  -     ACTH  -     Androstenedione  -     Cortisol  -     DHEA  -     Progesterone  -     TSH  -     Thyroid Antibodies  -     T4, Free  -     T4  -     T3, Free  -     T3  -     Testosterone, Free, Total  -     Testosterone  -     Luteinizing Hormone  -     Follicle Stimulating Hormone  -     Sex Horm Binding Globulin  -     Insulin Degludec 200 UNIT/ML solution pen-injector; Inject 60 Units under the skin Daily. Titrate as instructed with max of 100 units daily  -     Liraglutide (VICTOZA) 18 MG/3ML solution pen-injector injection; Victoza 1.8mg SC daily - start .6mg SC daily x 1 week, then 1.2mg SC daily x1 week then maintain at 1.8 mg SC daily  -     Empagliflozin-Metformin HCl (SYNJARDY) 5-1000 MG tablet; Take 5 mg by mouth 2 (Two) Times a Day.  -     Ambulatory Referral to Diabetic Education    Mixed hyperlipidemia  -     Comprehensive Metabolic Panel  -     Lipid Panel    Essential hypertension  -     Comprehensive Metabolic Panel    Chronic fatigue  -     Comprehensive Metabolic Panel  -     ACTH  -     Androstenedione  -     Cortisol  -     DHEA  -     Progesterone  -     TSH  -     Thyroid Antibodies  -     T4, Free  -     T4  -     T3, Free  -     T3  -     Testosterone, Free, Total  -     Testosterone  -     Luteinizing Hormone  -     Follicle Stimulating Hormone  -     Sex Horm Binding Globulin    Memory impairment  -     Comprehensive Metabolic Panel    Hypervitaminosis D   -     Vitamin D 25 Hydroxy          Plan:    In Summary: Dirk Atkins who presents for for evaluation and treatment of Type 2 diabetes mellitus insulin dependent. The initial diagnosis of diabetes was made 13 years ago.   The condition of diabetes and  clinical course has fluctuated with the severity worsening. The  modifying/allievating factors are  Insulin and oral medications. The Insulin and oral medications were reviewed with Dirk Atkins suggested compliance. His present regimen is basal insulIn  Levemir U100 40 units in AM  Levemir U100 40 units in PM and metformin 850 mg twice daily.  He reports associated symptoms of hyperglycemia have been pass out (mostly 2 hours after meals) and associated symptoms of hypoglycemia have been none, with his hypoglycemia threshold for symptoms is n/a mg/dl . The patient is currently taking home blood tests - Blood glucose testing: 3 times daily, that are:FB to 196 mg/dl, before lunch 127 to 273 mg/dl, and  after dinner/supper 130 to 253 mg/dl. The   patient's history were reviewed and updated as appropriate: allergies, current medications, past family history, past medical history, past social history, past surgical history and problem list.  There is no significant family history of diabetes with the exception of a younger brother.  Significant cardiovascular disease. Medical records, chart, and labs reviewed from primary care provider.  [facility] reviewed diabetic eye exam May 2016 by primary care, last eye exam in 2017 by [facility], his last flu vaccination 2017 and pneumonia vaccination is up-to-date.  At this time a detailed laboratory workup will be obtained and treat as indicated as with the results.  Today's medication changes were as follows:    Stop Metformin 850 mg twice daily and stop Levemir 40 units twice daily    Start Tresiba U200 insulin 60 units in the morning increase 1 unit daily if fasting blood glucoses over 110 mg/Brittany    Start Victoza 18 mg/3 ML titrate as instructed 1 injection daily    Start Synjardy 5/1000mg  by mouth twice daily with this medication take over-the-counter vitamin C at least 500 mg daily and vitamin D at least at thousand IUs daily increase water at least 3-4 bottles daily especially the first 3-4 weeks while  starting this medication      Additional instructions given with new glucometer:  home blood tests -  Blood glucose testin times daily, that are: fasting- 1st thing in morning before eating or drinking, before each meal and 1 or 2 hours after meal  Bedtime, anytime you feel symptoms of hyperglycemia or hypoglycemia (high or low blood sugars)    Education:  interpretation of lab results, blood sugar goals, complications of diabetes mellitus, hypoglycemia prevention and treatment, exercise, illness management, self-monitoring of blood glucose skills, nutrition, carbohydrate counting, site rotation, use of insulin pen, insulin adjustments, self-injection of insulin, use of sliding scale/correction formula and use of insulin: carb ratio        Return in about 3 months (around 2018), or if symptoms worsen or fail to improve, for Recheck.        Dragon transcription disclaimer     Much of this encounter note is an electronic transcription/translation of spoken language to printed text. The electronic translation of spoken language may permit erroneous, or at times, nonsensical words or phrases to be inadvertently transcribed. Although I have reviewed the note for such errors, some may still exist.  Answers for HPI/ROS submitted by the patient on 2017   Diabetes problem  Diabetes type: type 2  MedicAlert ID: No  Disease duration: 20 years  blurred vision: No  chest pain: No  fatigue: Yes  foot paresthesias: No  foot ulcerations: No  polydipsia: No  polyphagia: Yes  polyuria: Yes  visual change: Yes  weakness: Yes  weight loss: No  Symptom course: worsening  confusion: No  dizziness: No  headaches: Yes  hunger: Yes  mood changes: No  nervous/anxious: No  pallor: No  seizures: No  sleepiness: Yes  speech difficulty: No  sweats: No  tremors: No  blackouts: Yes  CVA: No  heart disease: Yes  impotence: Yes  nephropathy: No  peripheral neuropathy: No  PVD: No  retinopathy: No  CAD risks: dyslipidemia, family  history, hypertension, obesity, sedentary lifestyle  Current treatments: insulin injections, oral agent (monotherapy)  Treatment compliance: all of the time  Dose schedule: pre-breakfast, pre-dinner  Given by: patient  Injection sites: abdominal wall  Home blood tests: 3-4 x per day  Monitoring compliance: good  Blood glucose trend: fluctuating minimally  breakfast time: 5-6 am  breakfast glucose level: 140-180  dinner time: 6-7 pm  dinner glucose level: 180-200  High score: >200  Overall: 180-200  Weight trend: fluctuating minimally  Current diet: high fat/cholesterol  Meal planning: none  Exercise: intermittently  Dietitian visit: No  Eye exam current: Yes  Sees podiatrist: No

## 2017-11-30 RX ORDER — LANCETS 33 GAUGE
EACH MISCELLANEOUS
Qty: 200 EACH | Refills: 5 | Status: SHIPPED | OUTPATIENT
Start: 2017-11-30 | End: 2018-01-22 | Stop reason: CLARIF

## 2017-12-04 DIAGNOSIS — E55.9 VITAMIN D DEFICIENCY: Primary | ICD-10-CM

## 2017-12-04 LAB
25(OH)D3+25(OH)D2 SERPL-MCNC: 12.5 NG/ML (ref 30–100)
ACTH PLAS-MCNC: 15.8 PG/ML (ref 7.2–63.3)
ALBUMIN SERPL-MCNC: 4.4 G/DL (ref 3.5–4.8)
ALBUMIN/CREAT UR: 9.6 MG/G CREAT (ref 0–30)
ALBUMIN/GLOB SERPL: 1.7 {RATIO} (ref 1.2–2.2)
ALP SERPL-CCNC: 68 IU/L (ref 39–117)
ALT SERPL-CCNC: 10 IU/L (ref 0–44)
ANDROST SERPL-MCNC: 30 NG/DL (ref 22–96)
AST SERPL-CCNC: 7 IU/L (ref 0–40)
BILIRUB SERPL-MCNC: 0.3 MG/DL (ref 0–1.2)
BUN SERPL-MCNC: 15 MG/DL (ref 8–27)
BUN/CREAT SERPL: 18 (ref 10–24)
C PEPTIDE SERPL-MCNC: 7.8 NG/ML (ref 1.1–4.4)
CALCIUM SERPL-MCNC: 9.3 MG/DL (ref 8.6–10.2)
CHLORIDE SERPL-SCNC: 96 MMOL/L (ref 96–106)
CHOLEST SERPL-MCNC: 145 MG/DL (ref 100–199)
CO2 SERPL-SCNC: 27 MMOL/L (ref 18–29)
CORTIS SERPL-MCNC: 6.2 UG/DL
CREAT SERPL-MCNC: 0.84 MG/DL (ref 0.76–1.27)
CREAT UR-MCNC: 197.6 MG/DL
DHEA SERPL-MCNC: 37 NG/DL (ref 31–701)
FSH SERPL-ACNC: 9.2 MIU/ML (ref 1.5–12.4)
GFR SERPLBLD CREATININE-BSD FMLA CKD-EPI: 101 ML/MIN/1.73
GFR SERPLBLD CREATININE-BSD FMLA CKD-EPI: 87 ML/MIN/1.73
GLOBULIN SER CALC-MCNC: 2.6 G/DL (ref 1.5–4.5)
GLUCOSE SERPL-MCNC: 241 MG/DL (ref 65–99)
HBA1C MFR BLD: 7.8 % (ref 4.8–5.6)
HDLC SERPL-MCNC: 32 MG/DL
INSULIN SERPL-ACNC: 35.9 UIU/ML (ref 2.6–24.9)
INTERPRETATION: NORMAL
LDLC SERPL CALC-MCNC: 62 MG/DL (ref 0–99)
LH SERPL-ACNC: 8.6 MIU/ML (ref 1.7–8.6)
Lab: NORMAL
MICROALBUMIN UR-MCNC: 18.9 UG/ML
POTASSIUM SERPL-SCNC: 6.2 MMOL/L (ref 3.5–5.2)
PROGEST SERPL-MCNC: <0.1 NG/ML (ref 0–0.5)
PROT SERPL-MCNC: 7 G/DL (ref 6–8.5)
SHBG SERPL-SCNC: 47.9 NMOL/L (ref 19.3–76.4)
SODIUM SERPL-SCNC: 137 MMOL/L (ref 134–144)
T3 SERPL-MCNC: 114 NG/DL (ref 71–180)
T3FREE SERPL-MCNC: 3 PG/ML (ref 2–4.4)
T4 FREE SERPL-MCNC: 1.05 NG/DL (ref 0.82–1.77)
T4 SERPL-MCNC: 7 UG/DL (ref 4.5–12)
TESTOST FREE SERPL-MCNC: 7.4 PG/ML (ref 6.6–18.1)
TESTOST SERPL-MCNC: 310 NG/DL (ref 264–916)
THYROGLOB AB SERPL-ACNC: <1 IU/ML (ref 0–0.9)
THYROPEROXIDASE AB SERPL-ACNC: 8 IU/ML (ref 0–34)
TRIGL SERPL-MCNC: 257 MG/DL (ref 0–149)
TSH SERPL DL<=0.005 MIU/L-ACNC: 1.44 UIU/ML (ref 0.45–4.5)
URATE SERPL-MCNC: 6 MG/DL (ref 3.7–8.6)
VLDLC SERPL CALC-MCNC: 51 MG/DL (ref 5–40)

## 2017-12-04 RX ORDER — ERGOCALCIFEROL 1.25 MG/1
50000 CAPSULE ORAL WEEKLY
Qty: 24 CAPSULE | Refills: 3 | Status: SHIPPED | OUTPATIENT
Start: 2017-12-04 | End: 2017-12-06 | Stop reason: SDUPTHER

## 2017-12-06 DIAGNOSIS — E55.9 VITAMIN D DEFICIENCY: ICD-10-CM

## 2017-12-06 RX ORDER — ERGOCALCIFEROL 1.25 MG/1
CAPSULE ORAL
Qty: 24 CAPSULE | Refills: 3 | Status: SHIPPED | OUTPATIENT
Start: 2017-12-06 | End: 2017-12-07 | Stop reason: SDUPTHER

## 2017-12-07 DIAGNOSIS — E55.9 VITAMIN D DEFICIENCY: ICD-10-CM

## 2017-12-07 RX ORDER — ERGOCALCIFEROL 1.25 MG/1
CAPSULE ORAL
Qty: 24 CAPSULE | Refills: 3 | Status: SHIPPED | OUTPATIENT
Start: 2017-12-07 | End: 2018-02-07 | Stop reason: SDUPTHER

## 2018-01-02 DIAGNOSIS — E78.2 MIXED HYPERLIPIDEMIA: ICD-10-CM

## 2018-01-03 RX ORDER — SIMVASTATIN 40 MG
TABLET ORAL
Qty: 90 TABLET | Refills: 1 | Status: SHIPPED | OUTPATIENT
Start: 2018-01-03 | End: 2018-06-13 | Stop reason: SDUPTHER

## 2018-01-09 DIAGNOSIS — N40.0 ENLARGED PROSTATE WITHOUT LOWER URINARY TRACT SYMPTOMS (LUTS): ICD-10-CM

## 2018-01-09 DIAGNOSIS — F39 MOOD DISORDER (HCC): ICD-10-CM

## 2018-01-09 RX ORDER — CITALOPRAM 20 MG/1
20 TABLET ORAL DAILY
Qty: 90 TABLET | Refills: 0 | Status: SHIPPED | OUTPATIENT
Start: 2018-01-09 | End: 2018-02-18 | Stop reason: SDUPTHER

## 2018-01-09 RX ORDER — TAMSULOSIN HYDROCHLORIDE 0.4 MG/1
1 CAPSULE ORAL DAILY
Qty: 90 CAPSULE | Refills: 0 | Status: SHIPPED | OUTPATIENT
Start: 2018-01-09 | End: 2018-02-22 | Stop reason: SDUPTHER

## 2018-01-10 ENCOUNTER — HOSPITAL ENCOUNTER (OUTPATIENT)
Dept: DIABETES SERVICES | Facility: HOSPITAL | Age: 73
Discharge: HOME | End: 2018-01-10
Admitting: NURSE PRACTITIONER

## 2018-01-10 ENCOUNTER — TELEPHONE (OUTPATIENT)
Dept: INTERNAL MEDICINE | Age: 73
End: 2018-01-10

## 2018-01-10 PROCEDURE — G0108 DIAB MANAGE TRN  PER INDIV: HCPCS

## 2018-01-10 RX ORDER — GABAPENTIN 300 MG/1
300 CAPSULE ORAL 3 TIMES DAILY
COMMUNITY
End: 2018-01-10 | Stop reason: SDUPTHER

## 2018-01-10 RX ORDER — GABAPENTIN 300 MG/1
300 CAPSULE ORAL 3 TIMES DAILY
Qty: 270 CAPSULE | Refills: 0 | Status: SHIPPED | OUTPATIENT
Start: 2018-01-10 | End: 2018-04-30 | Stop reason: SDUPTHER

## 2018-01-10 NOTE — TELEPHONE ENCOUNTER
I do not have a problem with this as long as the gabapentin 300 mg 3 times daily is controlling his headaches.  If it ever gets to the point where the headaches are not controlled with this then we will need to make him a referral to another neurologist as I do not practice pain medicine control or neurology.  If he needs a prescription for gabapentin advise and we will generate 1.

## 2018-01-10 NOTE — TELEPHONE ENCOUNTER
PT called requesting for you to take over the medication Gabapitin 300mg. He states that Dr. Weber was the prescribing doctor but apparently he can't see her due to his insurance.   If you will write for this, its Gabapitin 300mg 3 tablets daily needed for headaches.   He is aware this is a written RX that he would have to come and  if you take the medication over.

## 2018-01-22 ENCOUNTER — PRIOR AUTHORIZATION (OUTPATIENT)
Dept: ENDOCRINOLOGY | Age: 73
End: 2018-01-22

## 2018-01-22 RX ORDER — LANCETS
EACH MISCELLANEOUS
Qty: 408 EACH | Refills: 1 | Status: SHIPPED | OUTPATIENT
Start: 2018-01-22 | End: 2018-01-25 | Stop reason: SDUPTHER

## 2018-01-22 RX ORDER — BLOOD-GLUCOSE METER
EACH MISCELLANEOUS
Qty: 1 KIT | Refills: 0 | Status: SHIPPED | OUTPATIENT
Start: 2018-01-22 | End: 2018-01-25 | Stop reason: SDUPTHER

## 2018-01-22 NOTE — TELEPHONE ENCOUNTER
Pt's PA for Onetouch Verio strips was submitted to [employer] on 1/22/18 and denied under Med part D but covered under MEd part B on 1/24/18

## 2018-01-25 RX ORDER — LANCETS
EACH MISCELLANEOUS
Qty: 408 EACH | Refills: 1 | Status: SHIPPED | OUTPATIENT
Start: 2018-01-25 | End: 2018-02-01 | Stop reason: ALTCHOICE

## 2018-01-25 RX ORDER — BLOOD-GLUCOSE METER
EACH MISCELLANEOUS
Qty: 1 KIT | Refills: 0 | Status: SHIPPED | OUTPATIENT
Start: 2018-01-25

## 2018-02-07 DIAGNOSIS — E55.9 VITAMIN D DEFICIENCY: ICD-10-CM

## 2018-02-07 RX ORDER — ERGOCALCIFEROL 1.25 MG/1
CAPSULE ORAL
Qty: 24 CAPSULE | Refills: 2 | Status: SHIPPED | OUTPATIENT
Start: 2018-02-07 | End: 2018-03-02 | Stop reason: SDUPTHER

## 2018-02-10 DIAGNOSIS — I25.10 CORONARY ARTERIOSCLEROSIS: ICD-10-CM

## 2018-02-12 RX ORDER — ISOSORBIDE MONONITRATE 30 MG/1
TABLET, EXTENDED RELEASE ORAL
Qty: 90 TABLET | Refills: 0 | Status: SHIPPED | OUTPATIENT
Start: 2018-02-12 | End: 2018-04-24 | Stop reason: SDUPTHER

## 2018-02-12 RX ORDER — FINASTERIDE 5 MG/1
TABLET, FILM COATED ORAL
Qty: 90 TABLET | Refills: 1 | Status: SHIPPED | OUTPATIENT
Start: 2018-02-12 | End: 2018-05-29 | Stop reason: SDUPTHER

## 2018-02-18 DIAGNOSIS — F39 MOOD DISORDER (HCC): ICD-10-CM

## 2018-02-19 RX ORDER — CITALOPRAM 20 MG/1
TABLET ORAL
Qty: 90 TABLET | Refills: 0 | Status: SHIPPED | OUTPATIENT
Start: 2018-02-19 | End: 2018-05-16 | Stop reason: SDUPTHER

## 2018-02-21 ENCOUNTER — LAB (OUTPATIENT)
Dept: ENDOCRINOLOGY | Age: 73
End: 2018-02-21

## 2018-02-21 DIAGNOSIS — E55.9 AVITAMINOSIS D: ICD-10-CM

## 2018-02-21 DIAGNOSIS — E78.2 MIXED HYPERLIPIDEMIA: Primary | ICD-10-CM

## 2018-02-21 DIAGNOSIS — E11.65 TYPE 2 DIABETES MELLITUS WITH HYPERGLYCEMIA, WITH LONG-TERM CURRENT USE OF INSULIN (HCC): ICD-10-CM

## 2018-02-21 DIAGNOSIS — R53.82 CHRONIC FATIGUE: ICD-10-CM

## 2018-02-21 DIAGNOSIS — E78.2 MIXED HYPERLIPIDEMIA: ICD-10-CM

## 2018-02-21 DIAGNOSIS — Z79.4 TYPE 2 DIABETES MELLITUS WITH HYPERGLYCEMIA, WITH LONG-TERM CURRENT USE OF INSULIN (HCC): ICD-10-CM

## 2018-02-22 DIAGNOSIS — N40.0 ENLARGED PROSTATE WITHOUT LOWER URINARY TRACT SYMPTOMS (LUTS): ICD-10-CM

## 2018-02-22 LAB
25(OH)D3+25(OH)D2 SERPL-MCNC: 69 NG/ML (ref 30–100)
ALBUMIN SERPL-MCNC: 4.1 G/DL (ref 3.5–5.2)
ALBUMIN/GLOB SERPL: 1.7 G/DL
ALP SERPL-CCNC: 51 U/L (ref 39–117)
ALT SERPL-CCNC: 8 U/L (ref 1–41)
AST SERPL-CCNC: 12 U/L (ref 1–40)
BILIRUB SERPL-MCNC: 0.4 MG/DL (ref 0.1–1.2)
BUN SERPL-MCNC: 13 MG/DL (ref 8–23)
BUN/CREAT SERPL: 14.3 (ref 7–25)
C PEPTIDE SERPL-MCNC: 8.3 NG/ML (ref 1.1–4.4)
CALCIUM SERPL-MCNC: 9.3 MG/DL (ref 8.6–10.5)
CHLORIDE SERPL-SCNC: 100 MMOL/L (ref 98–107)
CHOLEST SERPL-MCNC: 117 MG/DL (ref 0–200)
CO2 SERPL-SCNC: 29.8 MMOL/L (ref 22–29)
CREAT SERPL-MCNC: 0.91 MG/DL (ref 0.76–1.27)
GFR SERPLBLD CREATININE-BSD FMLA CKD-EPI: 82 ML/MIN/1.73
GFR SERPLBLD CREATININE-BSD FMLA CKD-EPI: 99 ML/MIN/1.73
GLOBULIN SER CALC-MCNC: 2.4 GM/DL
GLUCOSE SERPL-MCNC: 123 MG/DL (ref 65–99)
HBA1C MFR BLD: 5.18 % (ref 4.8–5.6)
HDLC SERPL-MCNC: 27 MG/DL (ref 40–60)
INTERPRETATION: NORMAL
LDLC SERPL CALC-MCNC: 60 MG/DL (ref 0–100)
Lab: NORMAL
POTASSIUM SERPL-SCNC: 5.6 MMOL/L (ref 3.5–5.2)
PROT SERPL-MCNC: 6.5 G/DL (ref 6–8.5)
SODIUM SERPL-SCNC: 139 MMOL/L (ref 136–145)
TRIGL SERPL-MCNC: 150 MG/DL (ref 0–150)
VLDLC SERPL CALC-MCNC: 30 MG/DL (ref 5–40)

## 2018-02-22 RX ORDER — TAMSULOSIN HYDROCHLORIDE 0.4 MG/1
CAPSULE ORAL
Qty: 90 CAPSULE | Refills: 1 | Status: SHIPPED | OUTPATIENT
Start: 2018-02-22 | End: 2018-08-22 | Stop reason: SDUPTHER

## 2018-03-02 ENCOUNTER — OFFICE VISIT (OUTPATIENT)
Dept: ENDOCRINOLOGY | Age: 73
End: 2018-03-02

## 2018-03-02 VITALS
HEIGHT: 78 IN | SYSTOLIC BLOOD PRESSURE: 112 MMHG | BODY MASS INDEX: 36.45 KG/M2 | WEIGHT: 315 LBS | DIASTOLIC BLOOD PRESSURE: 68 MMHG

## 2018-03-02 DIAGNOSIS — E11.65 TYPE 2 DIABETES MELLITUS WITH HYPERGLYCEMIA, WITH LONG-TERM CURRENT USE OF INSULIN (HCC): ICD-10-CM

## 2018-03-02 DIAGNOSIS — Z79.4 TYPE 2 DIABETES MELLITUS WITH HYPERGLYCEMIA, WITH LONG-TERM CURRENT USE OF INSULIN (HCC): ICD-10-CM

## 2018-03-02 DIAGNOSIS — E55.9 VITAMIN D DEFICIENCY: ICD-10-CM

## 2018-03-02 PROCEDURE — 99214 OFFICE O/P EST MOD 30 MIN: CPT | Performed by: NURSE PRACTITIONER

## 2018-03-02 RX ORDER — ERGOCALCIFEROL 1.25 MG/1
CAPSULE ORAL
Qty: 24 CAPSULE | Refills: 1 | Status: SHIPPED | OUTPATIENT
Start: 2018-03-02 | End: 2019-01-16 | Stop reason: SDUPTHER

## 2018-03-02 NOTE — PATIENT INSTRUCTIONS
Start Tresiba U200 insulin at 88 units in the morning increase 1 unit daily if fasting blood glucoses over 100-110 mg/Brittany    Start Victoza 18 mg/3 ML titrate as instructed 1 injection daily    Start Synjardy 5/1000mg  by mouth twice daily with this medication take over-the-counter vitamin C at least 500 mg daily and vitamin D at least at thousand IUs daily increase water at least 3-4 bottles daily especially the first 3-4 weeks while starting this medication    home blood tests -  Blood glucose testin times daily, that are: fasting- 1st thing in morning before eating or drinking, before each meal and 1 or 2 hours after meal  Bedtime, anytime you feel symptoms of hyperglycemia or hypoglycemia (high or low blood sugars)

## 2018-03-02 NOTE — PROGRESS NOTES
Dirk Atkins  presents to the office  for the follow-up appointment for Type 2 diabetes mellitus.     The diabete's condition location is throughout the system with the  clinical course has fluctuated, the severity is Mild, and the modifying/allievating factors are insulin.  Medications and  Dosages were  reviewed with Dirk Atkins and suggested that compliance most of the time.    Associated symptoms of hyperglycemia have been fainting.  Associated symptoms of hypoglycemia have been none. Patient reports  hypoglycemia threshold for symptoms is n/a mg/dl .     The patient is currently on insulin.     Compliance with blood glucose monitoring: good.     Meal panning: The patient is using avoidance of concentrated sweets.    The patient is currently taking home blood tests - Blood glucose testin-5 times daily, that are:  fasting- 1st thing in morning before eating or drinking  before each meal and 1 or 2 hours after meal  anytime you feel symptoms of hyperglycemia or hypoglycemia (high or low blood sugars)   basal insulIn  Tresiba U200 96 units in AM     Last instructions given were:  Stop Metformin 850 mg twice daily and stop Levemir 40 units twice daily     Start Tresiba U200 insulin at 60 units in the morning increase 1 unit daily if fasting blood glucoses over 110 mg/Brittany     Start Victoza 18 mg/3 ML titrate as instructed 1 injection daily     Start Synjardy 5/1000mg  by mouth twice daily with this medication take over-the-counter vitamin C at least 500 mg daily and vitamin D at least at thousand IUs daily increase water at least 3-4 bottles daily especially the first 3-4 weeks while starting this medication    Home blood glucose testing daily: 4-5  FB to 108 mg/dl   after breakfast 77 to 142 mg/dl  before lunch 99 to 131 mg/dl  after lunch 99 to 129 mg/dl  before dinner/supper 99 to 105 mg/dl   after dinner/supper 87 to 142 mg/dl    Last reported blood glucose readings are:  Home blood glucose testing  daily: 3  FB to 196 mg/dl  before lunch 127 to 273 mg/dl   after dinner/supper 130 to 253 mg/dl    Patterns reported per patient are none.         The following portions of the patient's history were reviewed and updated as appropriate: current medications, past family history, past medical history, past social history, past surgical history and problem list.      Current Outpatient Prescriptions:   •  aspirin 325 MG tablet, Take 325 mg by mouth daily., Disp: , Rfl:   •  Blood Glucose Calibration (ACCU-CHEK SMARTVIEW CONTROL VI), 2 (two) times a day., Disp: , Rfl:   •  Blood Glucose Monitoring Suppl (ACCU-CHEK CUBA PLUS) w/Device kit, Use kit to test blood glucose 4 times daily DX: e11.65, Disp: 1 kit, Rfl: 0  •  citalopram (CeleXA) 20 MG tablet, TAKE 1 TABLET EVERY DAY, Disp: 90 tablet, Rfl: 0  •  Empagliflozin-Metformin HCl (SYNJARDY) 5-1000 MG tablet, Take 5 mg by mouth 2 (Two) Times a Day., Disp: 180 tablet, Rfl: 1  •  finasteride (PROSCAR) 5 MG tablet, TAKE 1 TABLET EVERY DAY, Disp: 90 tablet, Rfl: 1  •  gabapentin (NEURONTIN) 300 MG capsule, Take 1 capsule by mouth 3 (Three) Times a Day., Disp: 270 capsule, Rfl: 0  •  glucose blood (ACCU-CHEK CUBA) test strip, test blood glucose 4 times daily DX: e11.65, Disp: 400 each, Rfl: 1  •  Insulin Degludec 200 UNIT/ML solution pen-injector, Inject 88 Units under the skin Daily. Titrate as instructed with max of 100 units daily, Disp: 27 pen, Rfl: 1  •  insulin detemir (LEVEMIR) 100 UNIT/ML injection, Take 40 units before breakfast and 40 units before supper, Disp: 100 mL, Rfl: 3  •  isosorbide mononitrate (IMDUR) 30 MG 24 hr tablet, TAKE 1 TABLET EVERY DAY, Disp: 90 tablet, Rfl: 0  •  Lancet Devices (ACCU-CHEK SOFTCLIX) lancets, Test blood glucose 4 times daily dx:e11.65, Disp: 400 each, Rfl: 2  •  Lancets (ACCU-CHEK SOFT TOUCH) lancets, test blood glucose 4 times daily DX: e11.65, Disp: 400 each, Rfl: 1  •  Liraglutide (VICTOZA) 18 MG/3ML solution pen-injector  injection, Victoza 1.8mg SC daily - start .6mg SC daily x 1 week, then 1.2mg SC daily x1 week then maintain at 1.8 mg SC daily, Disp: 9 pen, Rfl: 1  •  lisinopril (PRINIVIL,ZESTRIL) 2.5 MG tablet, 2.5 mg daily., Disp: , Rfl:   •  metoprolol tartrate (LOPRESSOR) 25 MG tablet, TAKE 1 TABLET TWICE DAILY, Disp: 180 tablet, Rfl: 0  •  NOVOFINE 32G X 6 MM misc, USE WITH INSULIN PEN AS DIRECTED, Disp: 100 each, Rfl: 3  •  simvastatin (ZOCOR) 40 MG tablet, TAKE 1 TABLET EVERY NIGHT, Disp: 90 tablet, Rfl: 1  •  tamsulosin (FLOMAX) 0.4 MG capsule 24 hr capsule, TAKE 1 CAPSULE EVERY DAY, Disp: 90 capsule, Rfl: 1  •  vitamin D (ERGOCALCIFEROL) 25764 units capsule capsule, 1 tablet By mouth twice weekly, Disp: 24 capsule, Rfl: 1    Patient Active Problem List    Diagnosis   • Chronic fatigue [R53.82]   • Hypersomnia [G47.10]   • Myocardial infarct, old [I25.2]   • Chronic tension headache [G44.229]     Overview Note:     Takes Gagapentin--Sees Neuro.     • B12 deficiency [E53.8]     Overview Note:     Currently using over-the-counter B12 supplements.     • Acute conjunctivitis [H10.30]   • Anxiety [F41.9]   • Coronary arteriosclerosis in native artery [I25.10]   • Edema [R60.9]   • Enlarged prostate [N40.0]   • Mixed hyperlipidemia [E78.2]   • Essential hypertension [I10]   • Leukocytosis [D72.829]   • Palpitations [R00.2]   • Type 2 diabetes mellitus with hyperglycemia, with long-term current use of insulin [E11.65, Z79.4]   • Chronic intractable headache [R51]   • Memory impairment [R41.3]   • Obstructive sleep apnea [G47.33]     Overview Note:     Declines C-Pap         Review of Systems   A comprehensive review of the 14 systems was negative except of listed below:  Endocrine: diabetic symptoms including polyphagia  hypoglycemia  hyperglycemia     Objective:     Wt Readings from Last 3 Encounters:   03/02/18 (!) 154 kg (340 lb)   11/29/17 (!) 156 kg (345 lb)   11/10/17 (!) 157 kg (347 lb)     Temp Readings from Last 3  "Encounters:   11/10/17 95.6 °F (35.3 °C)   09/19/16 97 °F (36.1 °C)   01/11/16 98 °F (36.7 °C)     BP Readings from Last 3 Encounters:   03/02/18 112/68   11/10/17 110/60   10/10/17 110/60     Pulse Readings from Last 3 Encounters:   11/10/17 70   10/10/17 72   09/14/17 70        /68  Ht 198.1 cm (77.99\")  Wt (!) 154 kg (340 lb)  BMI 39.3 kg/m2    General appearance:  alert, cooperative, delirious, fatigued, nourished\" \"appears stated age and cooperative\" \"NAD   Neck: no carotid bruit, supple, symmetrical, trachea midline and thyroid not enlarged, symmetric, no tenderness/mass/nodules   Thyroid:  no palpable nodule, no enlargement, no tenderness   Lung:  \"clear to auscultation bilaterally\" \"no abnormal breath sounds  \" effort was normal, no labored breath, no use of accessory muscles.   Heart: regular rate and rhythm, S1, S2 normal, no murmur, click, rub or gallop      Abdomen:  normal bowel sounds- 4 quads, soft non-tender, contour - rounded and contour - obese      Extremities: extremities normal, atraumatic, no cyanosis or edema extremities normal, atraumatic, no cyanosis or edema\" WNL - gait and station, strength and stability\"       Skin:   Pulses:  warm and dry, no hyperpigmentation, normal skin coloring, or suspicious lesions   2+ and symmetric   Neuro: Alert and oriented x3. Gait normal. Reflexes and motor strength normal and symmetric. Cranial nerves 2-12 and sensation grossly intact.      Psych: behavior - normal, judgement - normal, mood - normal, affect - normal                 Lab Review  Results for orders placed or performed in visit on 02/21/18   Comprehensive Metabolic Panel   Result Value Ref Range    Glucose 123 (H) 65 - 99 mg/dL    BUN 13 8 - 23 mg/dL    Creatinine 0.91 0.76 - 1.27 mg/dL    eGFR Non African Am 82 >60 mL/min/1.73    eGFR African Am 99 >60 mL/min/1.73    BUN/Creatinine Ratio 14.3 7.0 - 25.0    Sodium 139 136 - 145 mmol/L    Potassium 5.6 (H) 3.5 - 5.2 mmol/L    Chloride " 100 98 - 107 mmol/L    Total CO2 29.8 (H) 22.0 - 29.0 mmol/L    Calcium 9.3 8.6 - 10.5 mg/dL    Total Protein 6.5 6.0 - 8.5 g/dL    Albumin 4.10 3.50 - 5.20 g/dL    Globulin 2.4 gm/dL    A/G Ratio 1.7 g/dL    Total Bilirubin 0.4 0.1 - 1.2 mg/dL    Alkaline Phosphatase 51 39 - 117 U/L    AST (SGOT) 12 1 - 40 U/L    ALT (SGPT) 8 1 - 41 U/L   C-Peptide   Result Value Ref Range    C-Peptide 8.3 (H) 1.1 - 4.4 ng/mL   Hemoglobin A1c   Result Value Ref Range    Hemoglobin A1C 5.18 4.80 - 5.60 %   Vitamin D 25 Hydroxy   Result Value Ref Range    25 Hydroxy, Vitamin D 69.0 30.0 - 100.0 ng/ml   Lipid Panel   Result Value Ref Range    Total Cholesterol 117 0 - 200 mg/dL    Triglycerides 150 0 - 150 mg/dL    HDL Cholesterol 27 (L) 40 - 60 mg/dL    VLDL Cholesterol 30 5 - 40 mg/dL    LDL Cholesterol  60 0 - 100 mg/dL   Cardiovascular Risk Assessment   Result Value Ref Range    Interpretation Note    Diabetes Patient Education   Result Value Ref Range    PDF Image Not applicable            Assessment:   Dirk was seen today for follow-up.    Diagnoses and all orders for this visit:    Type 2 diabetes mellitus with hyperglycemia, with long-term current use of insulin  -     Liraglutide (VICTOZA) 18 MG/3ML solution pen-injector injection; Victoza 1.8mg SC daily - start .6mg SC daily x 1 week, then 1.2mg SC daily x1 week then maintain at 1.8 mg SC daily  -     Empagliflozin-Metformin HCl (SYNJARDY) 5-1000 MG tablet; Take 5 mg by mouth 2 (Two) Times a Day.  -     Insulin Degludec 200 UNIT/ML solution pen-injector; Inject 88 Units under the skin Daily. Titrate as instructed with max of 100 units daily    Vitamin D deficiency  -     vitamin D (ERGOCALCIFEROL) 88258 units capsule capsule; 1 tablet By mouth twice weekly        Plan:   In summary:  Met with the patient who is metabolically stable and doing well.  Reviewed lab work prior obtained to visit shows a significant decrease with A1c.  The patient is reporting and has self  monitoring blood glucose showing hypoglycemic in the morning with his fasting he is also reporting having to eat to stabilize and keep his blood glucose from becoming hypoglycemic.  With review of lab work and self-monitoring blood glucose with history of the following medication changes will be done today:    Start Tresiba U200 insulin at 88 units in the morning increase 1 unit daily if fasting blood glucoses over 100-110 mg/Brittany    Start Victoza 18 mg/3 ML titrate as instructed 1 injection daily    Start Synjardy 5/1000mg  by mouth twice daily with this medication take over-the-counter vitamin C at least 500 mg daily and vitamin D at least at thousand IUs daily increase water at least 3-4 bottles daily especially the first 3-4 weeks while starting this medication    home blood tests -  Blood glucose testin times daily, that are: fasting- 1st thing in morning before eating or drinking, before each meal and 1 or 2 hours after meal  Bedtime, anytime you feel symptoms of hyperglycemia or hypoglycemia (high or low blood sugars)      Education:  interpretation of lab results, blood sugar goals, complications of diabetes mellitus, hypoglycemia prevention and treatment, exercise, illness management, self-monitoring of blood glucose skills, nutrition, carbohydrate counting, site rotation, use of insulin pen, insulin adjustments, self-injection of insulin, use of sliding scale/correction formula and use of insulin: carb ratio        Office visit 25 minutes with additional education given 14 minutes - SMBG with goals, medication changes with purposes and s/e profiles.       Return in about 3 months (around 2018), or if symptoms worsen or fail to improve, for Recheck.      Dragon transcription disclaimer     Much of this encounter note is an electronic transcription/translation of spoken language to printed text. The electronic translation of spoken language may permit erroneous, or at times, nonsensical words or  phrases to be inadvertently transcribed. Although I have reviewed the note for such errors, some may still exist.  Answers for HPI/ROS submitted by the patient on 2/28/2018   Diabetes problem  Diabetes type: type 2  Disease duration: 18 years  blurred vision: No  chest pain: No  fatigue: Yes  foot paresthesias: No  foot ulcerations: No  polydipsia: No  polyphagia: Yes  polyuria: Yes  visual change: No  weakness: Yes  weight loss: No  Symptom course: improving  confusion: No  dizziness: Yes  headaches: Yes  hunger: Yes  mood changes: Yes  nervous/anxious: No  pallor: No  seizures: No  sleepiness: Yes  speech difficulty: No  sweats: No  tremors: No  blackouts: No  hospitalization: No  nocturnal hypoglycemia: No  required assistance: No  required glucagon: No  CVA: No  heart disease: Yes  impotence: Yes  nephropathy: No  peripheral neuropathy: No  PVD: No  retinopathy: No  CAD risks: dyslipidemia, hypertension, obesity, sedentary lifestyle  Current treatments: insulin injections, oral agent (monotherapy)  Treatment compliance: all of the time  Dose schedule: pre-breakfast  Given by: patient  Injection sites: abdominal wall  Home blood tests: 3-4 x per day  Home urines: <1 x per month  Monitoring compliance: excellent  Blood glucose trend: fluctuating minimally  breakfast time: 5-6 am  breakfast glucose level: 70-90  High score:   Overall: 110-130  Weight trend: fluctuating minimally  Current diet: vegetarian  Exercise: never  Dietitian visit: No  Eye exam current: Yes  Sees podiatrist: No

## 2018-03-09 ENCOUNTER — OFFICE VISIT (OUTPATIENT)
Dept: INTERNAL MEDICINE | Age: 73
End: 2018-03-09

## 2018-03-09 VITALS
OXYGEN SATURATION: 97 % | HEIGHT: 78 IN | RESPIRATION RATE: 12 BRPM | DIASTOLIC BLOOD PRESSURE: 60 MMHG | BODY MASS INDEX: 36.45 KG/M2 | SYSTOLIC BLOOD PRESSURE: 120 MMHG | WEIGHT: 315 LBS | HEART RATE: 85 BPM | TEMPERATURE: 96.4 F

## 2018-03-09 DIAGNOSIS — E11.65 TYPE 2 DIABETES MELLITUS WITH HYPERGLYCEMIA, WITH LONG-TERM CURRENT USE OF INSULIN (HCC): ICD-10-CM

## 2018-03-09 DIAGNOSIS — I10 ESSENTIAL HYPERTENSION: Primary | ICD-10-CM

## 2018-03-09 DIAGNOSIS — R21 RASH: ICD-10-CM

## 2018-03-09 DIAGNOSIS — Z79.4 TYPE 2 DIABETES MELLITUS WITH HYPERGLYCEMIA, WITH LONG-TERM CURRENT USE OF INSULIN (HCC): ICD-10-CM

## 2018-03-09 PROCEDURE — 99214 OFFICE O/P EST MOD 30 MIN: CPT | Performed by: INTERNAL MEDICINE

## 2018-03-09 NOTE — PROGRESS NOTES
Dirk Atkins is a 72 y.o. male who presents with   Chief Complaint   Patient presents with   • Hypertension     Check blood pressure   • Diabetes     Currently seeing Dr. Savage and associates.  Being seen primarily by his nurse practitionerHeather Hanson.  Blood sugars better    • Rash     Under breasts; groin areas.   .    Hypertension   This is a chronic problem. The current episode started more than 1 year ago. The problem is controlled. Past treatments include beta blockers and ACE inhibitors. Compliance problems include diet and exercise.    Diabetes   He has type 2 (Currently being seen and managed via endocrinology.  Lab testing now being done through that office.) diabetes mellitus.   Rash   This is a chronic problem. The current episode started more than 1 month ago. The affected locations include the groin and chest. He was exposed to nothing. Past treatments include nothing.        The following portions of the patient's history were reviewed and updated as appropriate: allergies, current medications, past medical history and problem list.    Review of Systems   Constitutional: Negative.    HENT: Negative.    Eyes: Negative.    Respiratory: Negative.    Cardiovascular: Negative.    Genitourinary: Negative.    Musculoskeletal: Negative.    Skin: Positive for rash.   Neurological: Negative.    Psychiatric/Behavioral: Negative.        Objective   Physical Exam   Constitutional: He is oriented to person, place, and time. He appears well-developed and well-nourished. No distress.   Morbidly obese 72-year-old male.   HENT:   Head: Normocephalic and atraumatic.   Eyes: Conjunctivae and EOM are normal. Pupils are equal, round, and reactive to light.   Neck: Normal range of motion. Neck supple. No thyromegaly present.   Neck exam negative.  Carotid auscultation normal-no bruits heard.   Cardiovascular: Normal rate, regular rhythm, normal heart sounds and intact distal pulses.  Exam reveals no gallop and no  friction rub.    No murmur heard.  Pulmonary/Chest: Effort normal and breath sounds normal. No respiratory distress. He has no wheezes. He has no rales. He exhibits no tenderness.   Neurological: He is alert and oriented to person, place, and time.   Skin:   Rash under breasts and groin appear to be a fungal-like rash   Psychiatric: He has a normal mood and affect. His behavior is normal. Judgment and thought content normal.   Nursing note and vitals reviewed.      Assessment/Plan   Dirk was seen today for hypertension, diabetes and rash.    Diagnoses and all orders for this visit:    Essential hypertension    Type 2 diabetes mellitus with hyperglycemia, with long-term current use of insulin    Rash      Plan:  #1 hypertension: Continue current treatment.  Six-month checkup advised.    #2 diabetes: Continue current treatment, lab testing and management via endocrinology.    #3 Rash: Try Lotrimin cream.  Also will need power hurting to the areas to keep these areas dry.  May need dermatology for clearing up problems persist.

## 2018-03-12 DIAGNOSIS — I10 ESSENTIAL HYPERTENSION: ICD-10-CM

## 2018-04-24 DIAGNOSIS — I25.10 CORONARY ARTERIOSCLEROSIS: ICD-10-CM

## 2018-04-25 RX ORDER — ISOSORBIDE MONONITRATE 30 MG/1
TABLET, EXTENDED RELEASE ORAL
Qty: 90 TABLET | Refills: 0 | Status: SHIPPED | OUTPATIENT
Start: 2018-04-25 | End: 2018-06-27 | Stop reason: SDUPTHER

## 2018-04-28 DIAGNOSIS — E11.65 TYPE 2 DIABETES MELLITUS WITH HYPERGLYCEMIA, WITH LONG-TERM CURRENT USE OF INSULIN (HCC): ICD-10-CM

## 2018-04-28 DIAGNOSIS — Z79.4 TYPE 2 DIABETES MELLITUS WITH HYPERGLYCEMIA, WITH LONG-TERM CURRENT USE OF INSULIN (HCC): ICD-10-CM

## 2018-04-30 ENCOUNTER — TELEPHONE (OUTPATIENT)
Dept: INTERNAL MEDICINE | Age: 73
End: 2018-04-30

## 2018-04-30 RX ORDER — GABAPENTIN 300 MG/1
300 CAPSULE ORAL 3 TIMES DAILY
Qty: 270 CAPSULE | Refills: 0 | Status: SHIPPED | OUTPATIENT
Start: 2018-04-30 | End: 2018-07-23 | Stop reason: SDUPTHER

## 2018-05-02 RX ORDER — LIRAGLUTIDE 6 MG/ML
INJECTION SUBCUTANEOUS
Qty: 18 ML | Refills: 1 | Status: SHIPPED | OUTPATIENT
Start: 2018-05-02 | End: 2018-07-31 | Stop reason: SDUPTHER

## 2018-05-15 ENCOUNTER — TELEPHONE (OUTPATIENT)
Dept: INTERNAL MEDICINE | Age: 73
End: 2018-05-15

## 2018-05-15 DIAGNOSIS — Z12.11 ENCOUNTER FOR SCREENING COLONOSCOPY: Primary | ICD-10-CM

## 2018-05-15 NOTE — TELEPHONE ENCOUNTER
Patient said he was here in March and we were supposed to set him up with Dr Romo for a colonoscopy. He called them and they don't have anything can we get an order put in for him. Any questions call patient at [phone]

## 2018-05-15 NOTE — TELEPHONE ENCOUNTER
"Go ahead and enter the referral to Dr. Romo for a screening colonoscopy-diagnosis \"health maintenance\"  "

## 2018-05-16 DIAGNOSIS — F39 MOOD DISORDER (HCC): ICD-10-CM

## 2018-05-17 DIAGNOSIS — I10 ESSENTIAL HYPERTENSION: ICD-10-CM

## 2018-05-17 RX ORDER — CITALOPRAM 20 MG/1
TABLET ORAL
Qty: 90 TABLET | Refills: 0 | Status: SHIPPED | OUTPATIENT
Start: 2018-05-17 | End: 2018-07-19 | Stop reason: SDUPTHER

## 2018-05-29 ENCOUNTER — LAB (OUTPATIENT)
Dept: ENDOCRINOLOGY | Age: 73
End: 2018-05-29

## 2018-05-29 DIAGNOSIS — E55.9 VITAMIN D DEFICIENCY: ICD-10-CM

## 2018-05-29 DIAGNOSIS — E11.65 TYPE 2 DIABETES MELLITUS WITH HYPERGLYCEMIA, WITH LONG-TERM CURRENT USE OF INSULIN (HCC): ICD-10-CM

## 2018-05-29 DIAGNOSIS — E78.2 MIXED HYPERLIPIDEMIA: ICD-10-CM

## 2018-05-29 DIAGNOSIS — Z79.4 TYPE 2 DIABETES MELLITUS WITH HYPERGLYCEMIA, WITH LONG-TERM CURRENT USE OF INSULIN (HCC): Primary | ICD-10-CM

## 2018-05-29 DIAGNOSIS — E11.65 TYPE 2 DIABETES MELLITUS WITH HYPERGLYCEMIA, WITH LONG-TERM CURRENT USE OF INSULIN (HCC): Primary | ICD-10-CM

## 2018-05-29 DIAGNOSIS — Z79.4 TYPE 2 DIABETES MELLITUS WITH HYPERGLYCEMIA, WITH LONG-TERM CURRENT USE OF INSULIN (HCC): ICD-10-CM

## 2018-05-29 RX ORDER — FINASTERIDE 5 MG/1
TABLET, FILM COATED ORAL
Qty: 90 TABLET | Refills: 0 | Status: SHIPPED | OUTPATIENT
Start: 2018-05-29 | End: 2018-11-20 | Stop reason: SDUPTHER

## 2018-05-30 LAB
25(OH)D3+25(OH)D2 SERPL-MCNC: 82 NG/ML (ref 30–100)
ALBUMIN SERPL-MCNC: 4.4 G/DL (ref 3.5–5.2)
ALBUMIN/GLOB SERPL: 1.9 G/DL
ALP SERPL-CCNC: 57 U/L (ref 39–117)
ALT SERPL-CCNC: 8 U/L (ref 1–41)
AST SERPL-CCNC: 11 U/L (ref 1–40)
BILIRUB SERPL-MCNC: 0.4 MG/DL (ref 0.1–1.2)
BUN SERPL-MCNC: 13 MG/DL (ref 8–23)
BUN/CREAT SERPL: 13.8 (ref 7–25)
C PEPTIDE SERPL-MCNC: 4.4 NG/ML (ref 1.1–4.4)
CALCIUM SERPL-MCNC: 9.5 MG/DL (ref 8.6–10.5)
CHLORIDE SERPL-SCNC: 102 MMOL/L (ref 98–107)
CHOLEST SERPL-MCNC: 120 MG/DL (ref 0–200)
CO2 SERPL-SCNC: 26.5 MMOL/L (ref 22–29)
CREAT SERPL-MCNC: 0.94 MG/DL (ref 0.76–1.27)
GFR SERPLBLD CREATININE-BSD FMLA CKD-EPI: 79 ML/MIN/1.73
GFR SERPLBLD CREATININE-BSD FMLA CKD-EPI: 96 ML/MIN/1.73
GLOBULIN SER CALC-MCNC: 2.3 GM/DL
GLUCOSE SERPL-MCNC: 80 MG/DL (ref 65–99)
HBA1C MFR BLD: 5.37 % (ref 4.8–5.6)
HDLC SERPL-MCNC: 34 MG/DL (ref 40–60)
INTERPRETATION: NORMAL
LDLC SERPL CALC-MCNC: 67 MG/DL (ref 0–100)
Lab: NORMAL
MICROALBUMIN UR-MCNC: 10.5 UG/ML
POTASSIUM SERPL-SCNC: 5.7 MMOL/L (ref 3.5–5.2)
PROT SERPL-MCNC: 6.7 G/DL (ref 6–8.5)
SODIUM SERPL-SCNC: 143 MMOL/L (ref 136–145)
TRIGL SERPL-MCNC: 94 MG/DL (ref 0–150)
VLDLC SERPL CALC-MCNC: 18.8 MG/DL (ref 5–40)

## 2018-06-05 ENCOUNTER — OFFICE VISIT (OUTPATIENT)
Dept: ENDOCRINOLOGY | Age: 73
End: 2018-06-05

## 2018-06-05 VITALS
SYSTOLIC BLOOD PRESSURE: 122 MMHG | DIASTOLIC BLOOD PRESSURE: 68 MMHG | BODY MASS INDEX: 36.45 KG/M2 | WEIGHT: 315 LBS | HEIGHT: 78 IN

## 2018-06-05 DIAGNOSIS — E11.65 TYPE 2 DIABETES MELLITUS WITH HYPERGLYCEMIA, WITH LONG-TERM CURRENT USE OF INSULIN (HCC): Primary | ICD-10-CM

## 2018-06-05 DIAGNOSIS — E78.2 MIXED HYPERLIPIDEMIA: ICD-10-CM

## 2018-06-05 DIAGNOSIS — E55.9 VITAMIN D DEFICIENCY: ICD-10-CM

## 2018-06-05 DIAGNOSIS — Z79.4 TYPE 2 DIABETES MELLITUS WITH HYPERGLYCEMIA, WITH LONG-TERM CURRENT USE OF INSULIN (HCC): Primary | ICD-10-CM

## 2018-06-05 DIAGNOSIS — I10 ESSENTIAL HYPERTENSION: ICD-10-CM

## 2018-06-05 PROCEDURE — 99214 OFFICE O/P EST MOD 30 MIN: CPT | Performed by: NURSE PRACTITIONER

## 2018-06-05 NOTE — PATIENT INSTRUCTIONS
Start Tresiba U200 insulin at 74 units in the morning decrease 2 unit every other day if fasting blood glucoses under  110 mg/Brittany    continue Victoza 18 mg/3 ML titrate as instructed 1 injection daily and Synjardy 5/1000mg  by mouth twice daily       home blood tests -  Blood glucose testin times daily, that are: fasting- 1st thing in morning before eating or drinking, before each meal and 1 or 2 hours after meal  Bedtime, anytime you feel symptoms of hyperglycemia or hypoglycemia (high or low blood sugars)

## 2018-06-05 NOTE — PROGRESS NOTES
Dirk Atkins  presents to the office  for the follow-up appointment for Type 2 diabetes mellitus.     The diabete's condition location is throughout the system with the  clinical course has fluctuated, the severity is Mild, and the modifying/allievating factors are insulin.  Medications and  Dosages were  reviewed with Dirk Atkins and suggested that compliance most of the time.    The patient reports associated symptoms of hyperglycemia have been fainting and associated symptoms of hypoglycemia have been none, with their  hypoglycemia threshold for symptoms is n/a mg/dl .     The patient is currently on insulin.    Compliance with blood glucose monitoring: good.     Meal panning: The patient is using avoidance of concentrated sweets.    The patient is currently taking home blood tests - Blood glucose testing: 3-4 times daily, that are:  fasting- 1st thing in morning before eating or drinking  before each meal  bedtime   basal insulIn  Tresiba U200 80 units in AM     Last instructions given were:  Start Tresiba U200 insulin at 88 units in the morning increase 1 unit daily if fasting blood glucoses over 100-110 mg/Brittany     Start Victoza 18 mg/3 ML titrate as instructed 1 injection daily     Start Synjardy 5/1000mg  by mouth twice daily with this medication take over-the-counter vitamin C at least 500 mg daily and vitamin D at least at thousand IUs daily increase water at least 3-4 bottles daily especially the first 3-4 weeks while starting this medication    Home blood glucose testing daily: 3-4  FB to 94 mg/dl  before lunch 80 to 120 mg/dl  before dinner/supper 77 to 120 mg/dl  bedtime 70 to 160 mg/dl    Last reported blood glucose readings are:  Home blood glucose testing daily: 4-5  FB to 108 mg/dl   after breakfast 77 to 142 mg/dl  before lunch 99 to 131 mg/dl  after lunch 99 to 129 mg/dl  before dinner/supper 99 to 105 mg/dl   after dinner/supper 87 to 142 mg/dl    Patterns reported per patient are  that he hreduced the amount of insulin he was taking from 88u to 80u due to hypoglycemia in the AM.         The following portions of the patient's history were reviewed and updated as appropriate: current medications, past family history, past medical history, past social history, past surgical history and problem list.      Current Outpatient Prescriptions:   •  aspirin 325 MG tablet, Take 325 mg by mouth daily., Disp: , Rfl:   •  Blood Glucose Calibration (ACCU-CHEK SMARTVIEW CONTROL VI), 2 (two) times a day., Disp: , Rfl:   •  Blood Glucose Monitoring Suppl (ACCU-CHEK CUBA PLUS) w/Device kit, Use kit to test blood glucose 4 times daily DX: e11.65, Disp: 1 kit, Rfl: 0  •  citalopram (CeleXA) 20 MG tablet, TAKE 1 TABLET EVERY DAY, Disp: 90 tablet, Rfl: 0  •  Empagliflozin-Metformin HCl (SYNJARDY) 5-1000 MG tablet, Take 5 mg by mouth 2 (Two) Times a Day., Disp: 180 tablet, Rfl: 1  •  finasteride (PROSCAR) 5 MG tablet, TAKE 1 TABLET EVERY DAY, Disp: 90 tablet, Rfl: 0  •  gabapentin (NEURONTIN) 300 MG capsule, Take 1 capsule by mouth 3 (Three) Times a Day., Disp: 270 capsule, Rfl: 0  •  glucose blood (ACCU-CHEK CUBA) test strip, test blood glucose 4 times daily DX: e11.65, Disp: 400 each, Rfl: 1  •  Insulin Degludec 200 UNIT/ML solution pen-injector, Inject 88 Units under the skin Daily. Titrate as instructed with max of 100 units daily, Disp: 27 pen, Rfl: 1  •  isosorbide mononitrate (IMDUR) 30 MG 24 hr tablet, TAKE 1 TABLET EVERY DAY, Disp: 90 tablet, Rfl: 0  •  Lancet Devices (ACCU-CHEK SOFTCLIX) lancets, Test blood glucose 4 times daily dx:e11.65, Disp: 400 each, Rfl: 2  •  Lancets (ACCU-CHEK SOFT TOUCH) lancets, test blood glucose 4 times daily DX: e11.65, Disp: 400 each, Rfl: 1  •  lisinopril (PRINIVIL,ZESTRIL) 2.5 MG tablet, 2.5 mg daily., Disp: , Rfl:   •  metoprolol tartrate (LOPRESSOR) 25 MG tablet, TAKE 1 TABLET TWICE DAILY, Disp: 180 tablet, Rfl: 0  •  NOVOFINE 32G X 6 MM misc, USE WITH INSULIN PEN AS  DIRECTED, Disp: 100 each, Rfl: 3  •  simvastatin (ZOCOR) 40 MG tablet, TAKE 1 TABLET EVERY NIGHT, Disp: 90 tablet, Rfl: 1  •  tamsulosin (FLOMAX) 0.4 MG capsule 24 hr capsule, TAKE 1 CAPSULE EVERY DAY, Disp: 90 capsule, Rfl: 1  •  VICTOZA 18 MG/3ML solution pen-injector injection, INJECT 0.6 MG SUBCUTANEOUSLY DAILY FOR 1 WEEK, THEN 1.2 MG DAILY  FOR 1 WEEK THEN MAINTAIN AT 1.8 MG SUBCUTANEOUSLY DAILY, Disp: 18 mL, Rfl: 1  •  vitamin D (ERGOCALCIFEROL) 05847 units capsule capsule, 1 tablet By mouth twice weekly, Disp: 24 capsule, Rfl: 1    Patient Active Problem List    Diagnosis   • Chronic fatigue [R53.82]   • Hypersomnia [G47.10]   • Myocardial infarct, old [I25.2]   • Chronic tension headache [G44.229]     Overview Note:     Takes Gagapentin--Sees Neuro.     • B12 deficiency [E53.8]     Overview Note:     Currently using over-the-counter B12 supplements.     • Acute conjunctivitis [H10.30]   • Anxiety [F41.9]   • Coronary arteriosclerosis in native artery [I25.10]   • Edema [R60.9]   • Enlarged prostate [N40.0]   • Mixed hyperlipidemia [E78.2]   • Essential hypertension [I10]   • Leukocytosis [D72.829]   • Palpitations [R00.2]   • Type 2 diabetes mellitus with hyperglycemia, with long-term current use of insulin [E11.65, Z79.4]   • Chronic intractable headache [R51]   • Memory impairment [R41.3]   • Obstructive sleep apnea [G47.33]     Overview Note:     Declines C-Pap         Review of Systems   A comprehensive review of the 14 systems was negative except of listed below:  Constitutional: fatigue  Eyes: positive for visual disturbance  Genitourinary:positive for frequency and nocturia  Endocrine: hypoglycemia  hyperglycemia     Objective:     Wt Readings from Last 3 Encounters:   06/05/18 (!) 155 kg (342 lb)   03/09/18 (!) 153 kg (338 lb)   03/02/18 (!) 154 kg (340 lb)     Temp Readings from Last 3 Encounters:   03/09/18 96.4 °F (35.8 °C)   11/10/17 95.6 °F (35.3 °C)   09/19/16 97 °F (36.1 °C)     BP Readings  "from Last 3 Encounters:   06/05/18 122/68   03/09/18 120/60   03/02/18 112/68     Pulse Readings from Last 3 Encounters:   03/09/18 85   11/10/17 70   10/10/17 72        /68   Ht 198.1 cm (77.99\")   Wt (!) 155 kg (342 lb)   BMI 39.53 kg/m²     General appearance:  alert, cooperative, delirious, fatigued, nourished\" \"appears stated age and cooperative\" \"NAD   Neck: no carotid bruit, supple, symmetrical, trachea midline and thyroid not enlarged, symmetric, no tenderness/mass/nodules   Thyroid:  no palpable nodule, no enlargement, no tenderness   Lung:  \"clear to auscultation bilaterally\" \"no abnormal breath sounds  \" effort was normal, no labored breath, no use of accessory muscles.   Heart: regular rate and rhythm, S1, S2 normal, no murmur, click, rub or gallop      Abdomen:  normal bowel sounds- 4 quads, soft non-tender, contour - rounded and contour - obese      Extremities: extremities normal, atraumatic, no cyanosis or edema extremities normal, atraumatic, no cyanosis or edema\" WNL - gait and station, strength and stability\"       Skin:   Pulses:  warm and dry, no hyperpigmentation, normal skin coloring, or suspicious lesions   2+ and symmetric   Neuro: Alert and oriented x3. Gait normal. Reflexes and motor strength normal and symmetric. Cranial nerves 2-12 and sensation grossly intact.      Psych: behavior - normal, judgement - normal, mood - normal, affect - normal                 Lab Review  Results for orders placed or performed in visit on 05/29/18   Comprehensive Metabolic Panel   Result Value Ref Range    Glucose 80 65 - 99 mg/dL    BUN 13 8 - 23 mg/dL    Creatinine 0.94 0.76 - 1.27 mg/dL    eGFR Non African Am 79 >60 mL/min/1.73    eGFR African Am 96 >60 mL/min/1.73    BUN/Creatinine Ratio 13.8 7.0 - 25.0    Sodium 143 136 - 145 mmol/L    Potassium 5.7 (H) 3.5 - 5.2 mmol/L    Chloride 102 98 - 107 mmol/L    Total CO2 26.5 22.0 - 29.0 mmol/L    Calcium 9.5 8.6 - 10.5 mg/dL    Total Protein 6.7 6.0 " - 8.5 g/dL    Albumin 4.40 3.50 - 5.20 g/dL    Globulin 2.3 gm/dL    A/G Ratio 1.9 g/dL    Total Bilirubin 0.4 0.1 - 1.2 mg/dL    Alkaline Phosphatase 57 39 - 117 U/L    AST (SGOT) 11 1 - 40 U/L    ALT (SGPT) 8 1 - 41 U/L   C-Peptide   Result Value Ref Range    C-Peptide 4.4 1.1 - 4.4 ng/mL   Hemoglobin A1c   Result Value Ref Range    Hemoglobin A1C 5.37 4.80 - 5.60 %   MicroAlbumin, Urine, Random - Urine, Clean Catch   Result Value Ref Range    Microalbumin, Urine 10.5 Not Estab. ug/mL   Vitamin D 25 Hydroxy   Result Value Ref Range    25 Hydroxy, Vitamin D 82.0 30.0 - 100.0 ng/ml   Lipid Panel   Result Value Ref Range    Total Cholesterol 120 0 - 200 mg/dL    Triglycerides 94 0 - 150 mg/dL    HDL Cholesterol 34 (L) 40 - 60 mg/dL    VLDL Cholesterol 18.8 5 - 40 mg/dL    LDL Cholesterol  67 0 - 100 mg/dL   Cardiovascular Risk Assessment   Result Value Ref Range    Interpretation Note    Diabetes Patient Education   Result Value Ref Range    PDF Image Not applicable            Assessment:   Dirk was seen today for follow-up.    Diagnoses and all orders for this visit:    Type 2 diabetes mellitus with hyperglycemia, with long-term current use of insulin  -     Comprehensive Metabolic Panel; Future  -     C-Peptide; Future  -     Hemoglobin A1c; Future  -     Insulin, Total; Future  -     Lipid Panel; Future  -     Microalbumin / Creatinine Urine Ratio - Urine, Clean Catch; Future  -     Uric Acid; Future  -     Vitamin D 25 Hydroxy; Future  -     TSH; Future  -     T4, Free; Future    Mixed hyperlipidemia  -     Comprehensive Metabolic Panel; Future    Vitamin D deficiency  -     Comprehensive Metabolic Panel; Future  -     Vitamin D 25 Hydroxy; Future    Essential hypertension  -     Comprehensive Metabolic Panel; Future          Plan:   In summary:  I met with the patient today who is metabolically stable and doing well.  Patient has no complaints with the exception of fasting hyperglycemia and intermittent  throughout the day.  He is already lowered his basal insulin.  Reviewed self-monitoring blood glucose logs and labs prior obtained to the visit we will decrease insulin further with instructions on how to decrease to maintain fasting blood glucose between 100 110 mg/Brittany.  States that he feels better approximately at 100 mg/Brittany.  Instructions for follow-up with labs 2 weeks prior was given.      Medication changes:       Start Tresiba U200 insulin at 74 units in the morning decrease 2 unit every other day if fasting blood glucoses under  110 mg/Brittany    continue Victoza 18 mg/3 ML titrate as instructed 1 injection daily and Synjardy 5/1000mg  by mouth twice daily       home blood tests -  Blood glucose testin times daily, that are: fasting- 1st thing in morning before eating or drinking, before each meal and 1 or 2 hours after meal  Bedtime, anytime you feel symptoms of hyperglycemia or hypoglycemia (high or low blood sugars)      Education:  interpretation of lab results, blood sugar goals, complications of diabetes mellitus, hypoglycemia prevention and treatment, exercise, illness management, self-monitoring of blood glucose skills, nutrition, carbohydrate counting, site rotation, use of insulin pen, insulin adjustments and self-injection of insulin        The total face to face time spent was  25 minutes  with additional education given: 14 minutes (greater than 50% of the total time) was spent with counseling and coordination of care on: SMBG with goals, Side effects profiles with medications, medication use and purposes    Return in about 3 months (around 2018), or if symptoms worsen or fail to improve, for Recheck.  3 months With Heather-2 weeks prior for labs 6 months with Dr. Savage-2 weeks prior for labs      Dragon transcription disclaimer     Much of this encounter note is an electronic transcription/translation of spoken language to printed text. The electronic translation of spoken language may permit  erroneous, or at times, nonsensical words or phrases to be inadvertently transcribed. Although I have reviewed the note for such errors, some may still exist.

## 2018-06-10 ENCOUNTER — PREP FOR SURGERY (OUTPATIENT)
Dept: OTHER | Facility: HOSPITAL | Age: 73
End: 2018-06-10

## 2018-06-10 DIAGNOSIS — Z12.11 SCREEN FOR COLON CANCER: Primary | ICD-10-CM

## 2018-06-13 DIAGNOSIS — E78.2 MIXED HYPERLIPIDEMIA: ICD-10-CM

## 2018-06-13 RX ORDER — SIMVASTATIN 40 MG
TABLET ORAL
Qty: 90 TABLET | Refills: 1 | Status: SHIPPED | OUTPATIENT
Start: 2018-06-13 | End: 2019-01-01 | Stop reason: SDUPTHER

## 2018-06-14 PROBLEM — Z12.11 SCREEN FOR COLON CANCER: Status: ACTIVE | Noted: 2018-06-14

## 2018-06-27 DIAGNOSIS — I25.10 CORONARY ARTERIOSCLEROSIS: ICD-10-CM

## 2018-06-27 RX ORDER — ISOSORBIDE MONONITRATE 30 MG/1
TABLET, EXTENDED RELEASE ORAL
Qty: 90 TABLET | Refills: 0 | Status: SHIPPED | OUTPATIENT
Start: 2018-06-27 | End: 2018-10-11 | Stop reason: SDUPTHER

## 2018-07-03 ENCOUNTER — HOSPITAL ENCOUNTER (OUTPATIENT)
Facility: HOSPITAL | Age: 73
Setting detail: HOSPITAL OUTPATIENT SURGERY
Discharge: HOME | End: 2018-07-03
Attending: INTERNAL MEDICINE | Admitting: INTERNAL MEDICINE

## 2018-07-03 ENCOUNTER — ANESTHESIA (OUTPATIENT)
Dept: GASTROENTEROLOGY | Facility: HOSPITAL | Age: 73
End: 2018-07-03

## 2018-07-03 ENCOUNTER — ANESTHESIA EVENT (OUTPATIENT)
Dept: GASTROENTEROLOGY | Facility: HOSPITAL | Age: 73
End: 2018-07-03

## 2018-07-03 VITALS
HEART RATE: 62 BPM | SYSTOLIC BLOOD PRESSURE: 107 MMHG | WEIGHT: 315 LBS | RESPIRATION RATE: 16 BRPM | BODY MASS INDEX: 36.45 KG/M2 | OXYGEN SATURATION: 94 % | DIASTOLIC BLOOD PRESSURE: 80 MMHG | HEIGHT: 78 IN | TEMPERATURE: 98.5 F

## 2018-07-03 DIAGNOSIS — Z12.11 SCREEN FOR COLON CANCER: ICD-10-CM

## 2018-07-03 LAB — GLUCOSE BLDC GLUCOMTR-MCNC: 79 MG/DL (ref 70–130)

## 2018-07-03 PROCEDURE — 82962 GLUCOSE BLOOD TEST: CPT

## 2018-07-03 PROCEDURE — 25010000002 PROPOFOL 1000 MG/ML EMULSION: Performed by: ANESTHESIOLOGY

## 2018-07-03 PROCEDURE — 45380 COLONOSCOPY AND BIOPSY: CPT | Performed by: INTERNAL MEDICINE

## 2018-07-03 PROCEDURE — 88305 TISSUE EXAM BY PATHOLOGIST: CPT | Performed by: INTERNAL MEDICINE

## 2018-07-03 PROCEDURE — 25010000002 PROPOFOL 10 MG/ML EMULSION: Performed by: ANESTHESIOLOGY

## 2018-07-03 RX ORDER — SODIUM CHLORIDE, SODIUM LACTATE, POTASSIUM CHLORIDE, CALCIUM CHLORIDE 600; 310; 30; 20 MG/100ML; MG/100ML; MG/100ML; MG/100ML
30 INJECTION, SOLUTION INTRAVENOUS CONTINUOUS PRN
Status: DISCONTINUED | OUTPATIENT
Start: 2018-07-03 | End: 2018-07-03 | Stop reason: HOSPADM

## 2018-07-03 RX ORDER — PROPOFOL 10 MG/ML
VIAL (ML) INTRAVENOUS AS NEEDED
Status: DISCONTINUED | OUTPATIENT
Start: 2018-07-03 | End: 2018-07-03 | Stop reason: SURG

## 2018-07-03 RX ORDER — LIDOCAINE HYDROCHLORIDE 20 MG/ML
INJECTION, SOLUTION INFILTRATION; PERINEURAL AS NEEDED
Status: DISCONTINUED | OUTPATIENT
Start: 2018-07-03 | End: 2018-07-03 | Stop reason: SURG

## 2018-07-03 RX ADMIN — SODIUM CHLORIDE, POTASSIUM CHLORIDE, SODIUM LACTATE AND CALCIUM CHLORIDE 30 ML/HR: 600; 310; 30; 20 INJECTION, SOLUTION INTRAVENOUS at 11:51

## 2018-07-03 RX ADMIN — LIDOCAINE HYDROCHLORIDE 60 MG: 20 INJECTION, SOLUTION INFILTRATION; PERINEURAL at 13:29

## 2018-07-03 RX ADMIN — PROPOFOL 140 MCG/KG/MIN: 10 INJECTION, EMULSION INTRAVENOUS at 13:29

## 2018-07-03 RX ADMIN — PROPOFOL 170 MG: 10 INJECTION, EMULSION INTRAVENOUS at 13:29

## 2018-07-03 NOTE — ANESTHESIA POSTPROCEDURE EVALUATION
"Patient: Dirk Atkins    Procedure Summary     Date:  07/03/18 Room / Location:   [facility]    Anesthesia Start:  1314 Anesthesia Stop:  1400    Procedure:  COLONOSCOPY to cecum and TI with biopsy / polypectomies (N/A ) Diagnosis:       Screen for colon cancer      (Screen for colon cancer [Z12.11])    Surgeon:  Cruz Romo MD Provider:  Evi Guzman MD    Anesthesia Type:  MAC ASA Status:  3          Anesthesia Type: MAC  Last vitals  BP   107/80 (07/03/18 1423)   Temp   36.9 °C (98.5 °F) (07/03/18 1413)   Pulse   62 (07/03/18 1423)   Resp   16 (07/03/18 1138)     SpO2   94 % (07/03/18 1423)     Post Anesthesia Care and Evaluation    Patient location during evaluation: bedside  Patient participation: complete - patient participated  Level of consciousness: awake and alert  Pain management: adequate  Airway patency: patent  Anesthetic complications: No anesthetic complications  PONV Status: none  Cardiovascular status: acceptable  Respiratory status: acceptable  Hydration status: acceptable    Comments: /80 (BP Location: Left arm, Patient Position: Lying)   Pulse 62   Temp 36.9 °C (98.5 °F) (Oral)   Resp 16   Ht 198.1 cm (78\")   Wt (!) 152 kg (336 lb 2 oz)   SpO2 94%   BMI 38.84 kg/m²         "

## 2018-07-03 NOTE — ANESTHESIA PREPROCEDURE EVALUATION
Anesthesia Evaluation     Patient summary reviewed   NPO Solid Status: > 8 hours  NPO Liquid Status: > 6 hours           Airway   Mallampati: II  TM distance: >3 FB  Dental      Pulmonary    (+) pneumonia , sleep apnea,   Cardiovascular     Rhythm: regular  Rate: normal    (+) hypertension 2 medications or greater, past MI , CAD, CABG > 6 Months, cardiac stents more than 12 months ago hyperlipidemia,       Neuro/Psych  GI/Hepatic/Renal/Endo    (+)   diabetes mellitus using insulin,     Musculoskeletal     Abdominal    Substance History      OB/GYN          Other                        Anesthesia Plan    ASA 3     MAC   total IV anesthesia  Anesthetic plan and risks discussed with patient.

## 2018-07-03 NOTE — H&P
[facility]  Pre Procedure History & Physical    Chief Complaint:   Time for my screening colonoscopy    Subjective     HPI:   73 y.o. male here for a screening colonoscopy.     Past Medical History:   Past Medical History:   Diagnosis Date   • Coronary artery disease    • Diabetes mellitus (CMS/HCC)    • Hypertension    • Measles    • Mumps    • Pneumonia        Family History:  Family History   Problem Relation Age of Onset   • Heart disease Mother    • Cancer Mother    • Heart disease Maternal Grandmother    • Heart disease Paternal Grandmother    • Diabetes Brother        Social History:   reports that he has quit smoking. He has never used smokeless tobacco. He reports that he does not drink alcohol or use drugs.    Medications:   Prescriptions Prior to Admission   Medication Sig Dispense Refill Last Dose   • Blood Glucose Calibration (ACCU-CHEK SMARTVIEW CONTROL VI) 2 (two) times a day.   7/3/2018 at Unknown time   • Blood Glucose Monitoring Suppl (ACCU-CHEK CUBA PLUS) w/Device kit Use kit to test blood glucose 4 times daily DX: e11.65 1 kit 0 7/3/2018 at Unknown time   • citalopram (CeleXA) 20 MG tablet TAKE 1 TABLET EVERY DAY 90 tablet 0 7/2/2018 at Unknown time   • finasteride (PROSCAR) 5 MG tablet TAKE 1 TABLET EVERY DAY 90 tablet 0 7/2/2018 at Unknown time   • gabapentin (NEURONTIN) 300 MG capsule Take 1 capsule by mouth 3 (Three) Times a Day. 270 capsule 0 7/2/2018 at Unknown time   • glucose blood (ACCU-CHEK CUBA) test strip test blood glucose 4 times daily DX: e11.65 400 each 1 7/3/2018 at Unknown time   • Insulin Degludec 200 UNIT/ML solution pen-injector Inject 88 Units under the skin Daily. Titrate as instructed with max of 100 units daily 27 pen 1 7/2/2018 at Unknown time   • isosorbide mononitrate (IMDUR) 30 MG 24 hr tablet TAKE 1 TABLET EVERY DAY 90 tablet 0 7/2/2018 at Unknown time   • Lancet Devices (ACCU-CHEK SOFTCLIX) lancets Test blood glucose 4 times daily  "dx:e11.65 400 each 2 7/3/2018 at Unknown time   • Lancets (ACCU-CHEK SOFT TOUCH) lancets test blood glucose 4 times daily DX: e11.65 400 each 1 7/3/2018 at Unknown time   • lisinopril (PRINIVIL,ZESTRIL) 2.5 MG tablet 2.5 mg daily.   7/2/2018 at Unknown time   • metoprolol tartrate (LOPRESSOR) 25 MG tablet TAKE 1 TABLET TWICE DAILY 180 tablet 0 7/2/2018 at Unknown time   • NOVOFINE 32G X 6 MM misc USE WITH INSULIN PEN AS DIRECTED 100 each 3 7/2/2018 at Unknown time   • simvastatin (ZOCOR) 40 MG tablet TAKE 1 TABLET EVERY NIGHT 90 tablet 1 7/2/2018 at Unknown time   • tamsulosin (FLOMAX) 0.4 MG capsule 24 hr capsule TAKE 1 CAPSULE EVERY DAY 90 capsule 1 7/2/2018 at Unknown time   • VICTOZA 18 MG/3ML solution pen-injector injection INJECT 0.6 MG SUBCUTANEOUSLY DAILY FOR 1 WEEK, THEN 1.2 MG DAILY  FOR 1 WEEK THEN MAINTAIN AT 1.8 MG SUBCUTANEOUSLY DAILY 18 mL 1 7/2/2018 at Unknown time   • vitamin D (ERGOCALCIFEROL) 45363 units capsule capsule 1 tablet By mouth twice weekly 24 capsule 1 7/2/2018 at Unknown time   • aspirin 325 MG tablet Take 325 mg by mouth daily.   7/1/2018   • Empagliflozin-Metformin HCl (SYNJARDY) 5-1000 MG tablet Take 5 mg by mouth 2 (Two) Times a Day. 180 tablet 1 7/2/2018       Allergies:  Clopidogrel and Prasugrel    ROS:    Pertinent items are noted in HPI     Objective     Blood pressure 128/69, pulse 71, temperature 97.9 °F (36.6 °C), temperature source Oral, resp. rate 16, height 198.1 cm (78\"), weight (!) 152 kg (336 lb 2 oz), SpO2 96 %.    Physical Exam   Constitutional: Pt is oriented to person, place, and time and well-developed, well-nourished, and in no distress.   HENT:   Mouth/Throat: Oropharynx is clear and moist.   Neck: Normal range of motion. Neck supple.   Cardiovascular: Normal rate, regular rhythm and normal heart sounds.    Pulmonary/Chest: Effort normal and breath sounds normal. No respiratory distress. No  wheezes.   Abdominal: Soft. Bowel sounds are normal.   Skin: Skin is " warm and dry.   Psychiatric: Mood, memory, affect and judgment normal.     Assessment/Plan     Diagnosis:  Encounter for screening for colon cancer    Anticipated Surgical Procedure:  Colonoscopy    The risks, benefits, and alternatives of this procedure have been discussed with the patient or the responsible party- the patient understands and agrees to proceed.

## 2018-07-05 ENCOUNTER — TELEPHONE (OUTPATIENT)
Dept: GASTROENTEROLOGY | Facility: CLINIC | Age: 73
End: 2018-07-05

## 2018-07-05 LAB
CYTO UR: NORMAL
LAB AP CASE REPORT: NORMAL
LAB AP CLINICAL INFORMATION: NORMAL
PATH REPORT.FINAL DX SPEC: NORMAL
PATH REPORT.GROSS SPEC: NORMAL

## 2018-07-05 NOTE — TELEPHONE ENCOUNTER
----- Message from Dirk Haro sent at 7/5/2018 10:30 AM EDT -----  Regarding: Test Results Question  Contact: [phone]  I have a question about COLONOSCOPY resulted on 7/3/18, 1:16 PM.    I had a colonoscopy on July 3rd 2018 My wife put the photos of my pallip the clothing bag and left the bag on the bed when she went to get the car I did not know she put the photos in the bag and the bag got threw in the trash can I was wondering if I can get another set of those photos mailed to me I had seven pallip removed and I like to know the size and location thanks Dirk haro [id number] [id number].  Also the whole staff who I seen was very kind and friendly they did a great job making me feel at home again thank you Dirk haro

## 2018-07-05 NOTE — PROGRESS NOTES
Tell him that the colon polyps that were removed were not cancerous but were precancerous. I recommend a repeat colonoscopy in one year because of the prep being only fair this time. Next year I would use a more aggressive colonoscopy prep like a Split Dose Go Lytely prep. [email or web address]

## 2018-07-11 RX ORDER — BLOOD SUGAR DIAGNOSTIC
STRIP MISCELLANEOUS
Qty: 400 EACH | Refills: 1 | Status: SHIPPED | OUTPATIENT
Start: 2018-07-11 | End: 2018-09-19 | Stop reason: SDUPTHER

## 2018-07-12 ENCOUNTER — TELEPHONE (OUTPATIENT)
Dept: GASTROENTEROLOGY | Facility: CLINIC | Age: 73
End: 2018-07-12

## 2018-07-12 NOTE — TELEPHONE ENCOUNTER
----- Message from Dirk Atkins sent at 7/12/2018  8:25 AM EDT -----  Regarding: Test Results Question  Contact: [phone]  I was checking my chart on line and I seen where my pathology report had come back from my colonoscopy but I did not tell me if it was normal can you please tell me the results thank you   You can put the result in my chart if it would be more convenient thank you

## 2018-07-12 NOTE — TELEPHONE ENCOUNTER
Call from pt.  Advise per Dr Romo that colon polyps that were removed were not cancerous, but were precancerous.  Recommend a repeat c/s in 1 yr because of prep being only fair.    Pt verb understanding.    C/s for 7/3/19 placed in recall.

## 2018-07-12 NOTE — TELEPHONE ENCOUNTER
----- Message from Cruz Romo MD sent at 7/5/2018  4:27 PM EDT -----  Tell him that the colon polyps that were removed were not cancerous but were precancerous. I recommend a repeat colonoscopy in one year because of the prep being only fair this time. Next year I would use a more aggressive colonoscopy prep like a Split Dose Go Lytely prep. [email or web address]

## 2018-07-17 ENCOUNTER — OFFICE VISIT (OUTPATIENT)
Dept: INTERNAL MEDICINE | Age: 73
End: 2018-07-17

## 2018-07-17 VITALS
HEART RATE: 69 BPM | TEMPERATURE: 96.2 F | OXYGEN SATURATION: 95 % | SYSTOLIC BLOOD PRESSURE: 138 MMHG | WEIGHT: 315 LBS | HEIGHT: 78 IN | DIASTOLIC BLOOD PRESSURE: 80 MMHG | RESPIRATION RATE: 13 BRPM | BODY MASS INDEX: 36.45 KG/M2

## 2018-07-17 DIAGNOSIS — M54.32 SCIATICA WITHOUT BACK PAIN, LEFT: Primary | ICD-10-CM

## 2018-07-17 DIAGNOSIS — I10 ESSENTIAL HYPERTENSION: ICD-10-CM

## 2018-07-17 PROCEDURE — 99214 OFFICE O/P EST MOD 30 MIN: CPT | Performed by: INTERNAL MEDICINE

## 2018-07-17 RX ORDER — METHYLPREDNISOLONE 4 MG/1
TABLET ORAL
Qty: 21 TABLET | Refills: 0 | Status: SHIPPED | OUTPATIENT
Start: 2018-07-17 | End: 2018-09-12

## 2018-07-17 NOTE — PROGRESS NOTES
"  Dirk Atkins is a 73 y.o. male who presents with   Chief Complaint   Patient presents with   • Sciatica   • Hypertension   .    73-year-old male.  History of diabetes being cared for, managed and treated via endocrinology presents with a history that for the past 3 days he has been having left thigh and left leg pain with numbness and tingling in both lower extremities.  He has tried taking 3 Aleve at one time with no benefit.  He has also tried Advil 800 mg as a one-time dose with no benefit.  He says that if he stands in one spot for any length of time his left leg will \"give out\" but if he walks his symptoms seem to get better.      Sciatica   This is a new problem. The current episode started in the past 7 days. The problem occurs constantly. The problem has been unchanged. Associated symptoms include numbness and weakness. Exacerbated by: Standing; lying down. He has tried NSAIDs for the symptoms. The treatment provided no relief.   Hypertension   This is a chronic problem. The current episode started more than 1 year ago. Current antihypertension treatment includes beta blockers and ACE inhibitors. Compliance problems include diet and exercise.         The following portions of the patient's history were reviewed and updated as appropriate: allergies, current medications, past medical history and problem list.    Review of Systems   HENT: Negative.    Eyes: Negative.    Respiratory: Negative.    Cardiovascular: Negative.    Genitourinary: Negative.    Musculoskeletal: Negative.    Skin: Negative.    Neurological: Positive for weakness and numbness.   Psychiatric/Behavioral: Negative.        Objective   Physical Exam   Constitutional: He is oriented to person, place, and time. He appears well-developed and well-nourished. No distress.   73-year-old morbidly obese male whose current weight is 338 pounds and whose estimated weight for his height is 230 pounds presents with history as above suggesting sciatica " of the left leg.   HENT:   Head: Normocephalic and atraumatic.   Eyes: Conjunctivae and EOM are normal. Pupils are equal, round, and reactive to light.   Neck: Normal range of motion. Neck supple. No thyromegaly present.   Neck exam negative.  Carotid auscultation normal-no bruits heard.   Cardiovascular: Normal rate, regular rhythm, normal heart sounds and intact distal pulses.  Exam reveals no gallop and no friction rub.    No murmur heard.  Pulmonary/Chest: Effort normal and breath sounds normal. No respiratory distress. He has no wheezes. He has no rales. He exhibits no tenderness.   Musculoskeletal: Normal range of motion. He exhibits no edema, tenderness or deformity.   Neurological: He is alert and oriented to person, place, and time. He displays normal reflexes. No cranial nerve deficit or sensory deficit. He exhibits normal muscle tone. Coordination normal.   Psychiatric: He has a normal mood and affect. His behavior is normal. Judgment and thought content normal.   Nursing note and vitals reviewed.      Assessment/Plan   Dirk was seen today for sciatica and hypertension.    Diagnoses and all orders for this visit:    Sciatica without back pain, left  -     MethylPREDNISolone (MEDROL, HE,) 4 MG tablet; Take as directed on package instructions.    Essential hypertension      Plan: Since he has tried NSAIDs with no benefit we will try him on a Medrol dosepak with the understanding I have time that the steroids will adversely affect his blood sugar levels.  He is willing to try it anyway he says.  Patient advised that if the problem persists in spite of treatment he will need an MRI of the lumbosacral spine.    Hypertension: Blood pressure under good control.  Continue current treatment as prescribed.    Diabetes: Continue endocrinology care, treatment, prescription treatment, lab testing and general management.    The patient currently is a former smoker.  A low-dose CT scan of the lungs has been advised  but at this point the patient declines the test.  The patient is fully aware of the fact that the test is a screening test for lung cancer and that it is advised by radiology to be done in smokers and former smokers of the age of the patient.  Furthermore the patient is aware that declining the low-dose CT scan means that lung cancer if present may not be detected and may progress to the point of being metastatic and eventually terminal.  The patient is willing to assume those risks based on the current decision made.

## 2018-07-19 DIAGNOSIS — F39 MOOD DISORDER (HCC): ICD-10-CM

## 2018-07-19 RX ORDER — CITALOPRAM 20 MG/1
TABLET ORAL
Qty: 90 TABLET | Refills: 0 | Status: SHIPPED | OUTPATIENT
Start: 2018-07-19 | End: 2018-07-23 | Stop reason: ALTCHOICE

## 2018-07-23 ENCOUNTER — OFFICE VISIT (OUTPATIENT)
Dept: INTERNAL MEDICINE | Age: 73
End: 2018-07-23

## 2018-07-23 ENCOUNTER — HOSPITAL ENCOUNTER (OUTPATIENT)
Dept: GENERAL RADIOLOGY | Facility: HOSPITAL | Age: 73
Discharge: HOME | End: 2018-07-23
Admitting: INTERNAL MEDICINE

## 2018-07-23 VITALS
BODY MASS INDEX: 36.45 KG/M2 | RESPIRATION RATE: 13 BRPM | DIASTOLIC BLOOD PRESSURE: 80 MMHG | OXYGEN SATURATION: 95 % | SYSTOLIC BLOOD PRESSURE: 130 MMHG | HEART RATE: 75 BPM | HEIGHT: 78 IN | WEIGHT: 315 LBS | TEMPERATURE: 96.3 F

## 2018-07-23 DIAGNOSIS — M25.572 ACUTE BILATERAL ANKLE PAIN: Primary | ICD-10-CM

## 2018-07-23 DIAGNOSIS — M25.571 ACUTE BILATERAL ANKLE PAIN: Primary | ICD-10-CM

## 2018-07-23 PROCEDURE — 99214 OFFICE O/P EST MOD 30 MIN: CPT | Performed by: INTERNAL MEDICINE

## 2018-07-23 PROCEDURE — 73610 X-RAY EXAM OF ANKLE: CPT

## 2018-07-23 RX ORDER — DULOXETIN HYDROCHLORIDE 30 MG/1
CAPSULE, DELAYED RELEASE ORAL
Qty: 60 CAPSULE | Refills: 3 | Status: SHIPPED | OUTPATIENT
Start: 2018-07-23 | End: 2018-09-12 | Stop reason: SDUPTHER

## 2018-07-23 RX ORDER — GABAPENTIN 300 MG/1
300 CAPSULE ORAL 3 TIMES DAILY
Qty: 270 CAPSULE | Refills: 0 | Status: SHIPPED | OUTPATIENT
Start: 2018-07-23 | End: 2019-01-07 | Stop reason: SDUPTHER

## 2018-07-23 NOTE — PROGRESS NOTES
"  Dirk Atkins is a 73 y.o. male who presents with   Chief Complaint   Patient presents with   • Left ankle/leg pain   .    73-year-old male presents with continued left ankle and left lower extremity leg pain.  He was seen a few days ago and was tried on a Medrol Dosepak for what sounded like sciatica but he obtained no benefit from it.  On today's visit he gives a different sort of a picture to his problem.  He says that if he stands for any length of time he has left ankle pain that progresses up to his left calf and into his left leg.  If he sits the symptoms get better to the point of becoming \"tolerable\".  He says if he takes one gabapentin 300 mg and two citalopram (20 mg tablets each) twice a day the symptoms are made bearable enough for him to get through the day.  He denies any new or additional numbness or tingling above or beyond what he has \"normally\" with his diabetes and for which he takes gabapentin.  Furthermore he says the longer he stands the weaker his left leg gets to the point of feeling like it will \"give out\".  Walking seems to exacerbate his symptoms but not as much as standing.         The following portions of the patient's history were reviewed and updated as appropriate: allergies, current medications, past medical history and problem list.    Review of Systems   HENT: Negative.    Eyes: Negative.    Respiratory: Negative.    Cardiovascular: Negative.    Genitourinary: Negative.    Musculoskeletal: Positive for arthralgias.   Skin: Negative.    Neurological: Positive for weakness. Negative for numbness.   Psychiatric/Behavioral: Negative.        Objective   Physical Exam   Constitutional: He is oriented to person, place, and time. He appears well-developed and well-nourished. No distress.   HENT:   Head: Normocephalic and atraumatic.   Eyes: Pupils are equal, round, and reactive to light. Conjunctivae and EOM are normal.   Neck: Normal range of motion. Neck supple. No thyromegaly " present.   Neck exam negative.  Carotid auscultation normal-no bruits heard.   Cardiovascular: Normal rate, regular rhythm, normal heart sounds and intact distal pulses.  Exam reveals no gallop and no friction rub.    No murmur heard.  Pulmonary/Chest: Effort normal and breath sounds normal. No respiratory distress. He has no wheezes. He has no rales. He exhibits no tenderness.   Musculoskeletal: Normal range of motion. He exhibits no edema, tenderness or deformity.   Neurological: He is alert and oriented to person, place, and time. He displays normal reflexes. No cranial nerve deficit or sensory deficit. He exhibits normal muscle tone. Coordination normal.   Psychiatric: He has a normal mood and affect. His behavior is normal. Judgment and thought content normal.   Nursing note and vitals reviewed.      Assessment/Plan   Dirk was seen today for left ankle/leg pain.    Diagnoses and all orders for this visit:    Acute bilateral ankle pain  -     DULoxetine (CYMBALTA) 30 MG capsule; Take one tablet every 12 hours  -     XR Ankle 3+ View Bilateral    Other orders  -     gabapentin (NEURONTIN) 300 MG capsule; Take 1 capsule by mouth 3 (Three) Times a Day.      Plan: We will get an x-ray of the patient's ankles bilaterall  .  We will discontinue the citalopram in favor of Cymbalta 30 mg twice a day for its pain relieving qualities and he will continue his gabapentin 300 mg 3 times a day as currently prescribed.  If problems persist we may need to consider a Doppler study of the left lower extremity and/or evaluation by orthopedics.

## 2018-07-24 NOTE — PROGRESS NOTES
There are spurs seen at both right and left heels but no other acute abnormalities are seen in either ankle.  If problems persist we may need to consider a Doppler study to check on the circulation and/or orthopedic referral for further evaluation from their standpoint.

## 2018-07-31 DIAGNOSIS — E11.65 TYPE 2 DIABETES MELLITUS WITH HYPERGLYCEMIA, WITH LONG-TERM CURRENT USE OF INSULIN (HCC): ICD-10-CM

## 2018-07-31 DIAGNOSIS — Z79.4 TYPE 2 DIABETES MELLITUS WITH HYPERGLYCEMIA, WITH LONG-TERM CURRENT USE OF INSULIN (HCC): ICD-10-CM

## 2018-07-31 RX ORDER — LIRAGLUTIDE 6 MG/ML
INJECTION SUBCUTANEOUS
Qty: 27 ML | Refills: 1 | Status: SHIPPED | OUTPATIENT
Start: 2018-07-31 | End: 2018-08-08 | Stop reason: SDUPTHER

## 2018-08-08 DIAGNOSIS — E11.65 TYPE 2 DIABETES MELLITUS WITH HYPERGLYCEMIA, WITH LONG-TERM CURRENT USE OF INSULIN (HCC): ICD-10-CM

## 2018-08-08 DIAGNOSIS — Z79.4 TYPE 2 DIABETES MELLITUS WITH HYPERGLYCEMIA, WITH LONG-TERM CURRENT USE OF INSULIN (HCC): ICD-10-CM

## 2018-08-22 DIAGNOSIS — N40.0 ENLARGED PROSTATE WITHOUT LOWER URINARY TRACT SYMPTOMS (LUTS): ICD-10-CM

## 2018-08-22 RX ORDER — TAMSULOSIN HYDROCHLORIDE 0.4 MG/1
CAPSULE ORAL
Qty: 90 CAPSULE | Refills: 1 | Status: SHIPPED | OUTPATIENT
Start: 2018-08-22 | End: 2019-02-01 | Stop reason: SDUPTHER

## 2018-09-05 ENCOUNTER — RESULTS ENCOUNTER (OUTPATIENT)
Dept: ENDOCRINOLOGY | Age: 73
End: 2018-09-05

## 2018-09-05 ENCOUNTER — LAB (OUTPATIENT)
Dept: ENDOCRINOLOGY | Age: 73
End: 2018-09-05

## 2018-09-05 DIAGNOSIS — I10 ESSENTIAL HYPERTENSION: ICD-10-CM

## 2018-09-05 DIAGNOSIS — Z79.4 TYPE 2 DIABETES MELLITUS WITH HYPERGLYCEMIA, WITH LONG-TERM CURRENT USE OF INSULIN (HCC): ICD-10-CM

## 2018-09-05 DIAGNOSIS — E78.2 MIXED HYPERLIPIDEMIA: ICD-10-CM

## 2018-09-05 DIAGNOSIS — E11.65 TYPE 2 DIABETES MELLITUS WITH HYPERGLYCEMIA, WITH LONG-TERM CURRENT USE OF INSULIN (HCC): ICD-10-CM

## 2018-09-05 DIAGNOSIS — E55.9 VITAMIN D DEFICIENCY: ICD-10-CM

## 2018-09-06 LAB
25(OH)D3+25(OH)D2 SERPL-MCNC: 113.4 NG/ML (ref 30–100)
ALBUMIN SERPL-MCNC: 4.7 G/DL (ref 3.5–5.2)
ALBUMIN/CREAT UR: 4.2 MG/G CREAT (ref 0–30)
ALBUMIN/GLOB SERPL: 2.4 G/DL
ALP SERPL-CCNC: 68 U/L (ref 39–117)
ALT SERPL-CCNC: 12 U/L (ref 1–41)
AST SERPL-CCNC: 10 U/L (ref 1–40)
BILIRUB SERPL-MCNC: 0.4 MG/DL (ref 0.1–1.2)
BUN SERPL-MCNC: 12 MG/DL (ref 8–23)
BUN/CREAT SERPL: 12.2 (ref 7–25)
C PEPTIDE SERPL-MCNC: 6.6 NG/ML (ref 1.1–4.4)
CALCIUM SERPL-MCNC: 9.5 MG/DL (ref 8.6–10.5)
CHLORIDE SERPL-SCNC: 100 MMOL/L (ref 98–107)
CHOLEST SERPL-MCNC: 119 MG/DL (ref 0–200)
CO2 SERPL-SCNC: 25.5 MMOL/L (ref 22–29)
CREAT SERPL-MCNC: 0.98 MG/DL (ref 0.76–1.27)
CREAT UR-MCNC: 95.1 MG/DL
GLOBULIN SER CALC-MCNC: 2 GM/DL
GLUCOSE SERPL-MCNC: 119 MG/DL (ref 65–99)
HBA1C MFR BLD: 5.58 % (ref 4.8–5.6)
HDLC SERPL-MCNC: 30 MG/DL (ref 40–60)
INSULIN SERPL-ACNC: 25.6 UIU/ML (ref 2.6–24.9)
INTERPRETATION: NORMAL
LDLC SERPL CALC-MCNC: 61 MG/DL (ref 0–100)
Lab: NORMAL
MICROALBUMIN UR-MCNC: 4 UG/ML
POTASSIUM SERPL-SCNC: 5 MMOL/L (ref 3.5–5.2)
PROT SERPL-MCNC: 6.7 G/DL (ref 6–8.5)
SODIUM SERPL-SCNC: 140 MMOL/L (ref 136–145)
T4 FREE SERPL-MCNC: 0.99 NG/DL (ref 0.93–1.7)
TRIGL SERPL-MCNC: 139 MG/DL (ref 0–150)
TSH SERPL DL<=0.005 MIU/L-ACNC: 1.03 MIU/ML (ref 0.27–4.2)
URATE SERPL-MCNC: 5.5 MG/DL (ref 3.4–7)
VLDLC SERPL CALC-MCNC: 27.8 MG/DL (ref 5–40)

## 2018-09-12 ENCOUNTER — OFFICE VISIT (OUTPATIENT)
Dept: INTERNAL MEDICINE | Age: 73
End: 2018-09-12

## 2018-09-12 VITALS
WEIGHT: 315 LBS | OXYGEN SATURATION: 93 % | HEIGHT: 78 IN | SYSTOLIC BLOOD PRESSURE: 110 MMHG | TEMPERATURE: 96.5 F | HEART RATE: 69 BPM | BODY MASS INDEX: 36.45 KG/M2 | DIASTOLIC BLOOD PRESSURE: 60 MMHG | RESPIRATION RATE: 12 BRPM

## 2018-09-12 DIAGNOSIS — M25.571 ACUTE BILATERAL ANKLE PAIN: ICD-10-CM

## 2018-09-12 DIAGNOSIS — M25.572 ACUTE BILATERAL ANKLE PAIN: ICD-10-CM

## 2018-09-12 DIAGNOSIS — M54.32 SCIATICA OF LEFT SIDE: Primary | ICD-10-CM

## 2018-09-12 PROCEDURE — 99214 OFFICE O/P EST MOD 30 MIN: CPT | Performed by: INTERNAL MEDICINE

## 2018-09-12 RX ORDER — DULOXETIN HYDROCHLORIDE 30 MG/1
CAPSULE, DELAYED RELEASE ORAL
Qty: 180 CAPSULE | Refills: 3 | Status: SHIPPED | OUTPATIENT
Start: 2018-09-12 | End: 2019-08-03 | Stop reason: SDUPTHER

## 2018-09-12 RX ORDER — NITROGLYCERIN 0.4 MG/1
0.4 TABLET SUBLINGUAL
COMMUNITY
Start: 2017-07-25

## 2018-09-12 NOTE — PROGRESS NOTES
Dirk Atkins is a 73 y.o. male who presents with   Chief Complaint   Patient presents with   • Ankle pain-left ankle     Symptoms much better with Cymbalta 30 mg twice a day combined with his current dosage of gabapentin   • Sciatica-left leg     As above   .    73-year-old male presents for follow-up of his left ankle pain and left sciatic leg pain.  He reports significant improvement in symptoms with the addition of Cymbalta 30 mg twice a day combined with his gabapentin 300 mg 3 times a day.  Also his weight has decreased from his last visit.         The following portions of the patient's history were reviewed and updated as appropriate: allergies, current medications, past medical history and problem list.    Review of Systems   Constitutional: Negative.    HENT: Negative.    Eyes: Negative.    Respiratory: Negative.    Cardiovascular: Negative.    Genitourinary: Negative.    Musculoskeletal: Negative.    Skin: Negative.    Neurological: Negative.    Psychiatric/Behavioral: Negative.        Objective   Physical Exam   Constitutional: He is oriented to person, place, and time. He appears well-developed and well-nourished. No distress.   HENT:   Head: Normocephalic and atraumatic.   Eyes: Pupils are equal, round, and reactive to light. Conjunctivae and EOM are normal.   Neck: Normal range of motion. Neck supple. No thyromegaly present.   Neck exam negative.  Carotid auscultation normal-no bruits heard.   Cardiovascular: Normal rate, regular rhythm, normal heart sounds and intact distal pulses.  Exam reveals no gallop and no friction rub.    No murmur heard.  Pulmonary/Chest: Effort normal and breath sounds normal. No respiratory distress. He has no wheezes. He has no rales. He exhibits no tenderness.   Musculoskeletal: He exhibits no edema, tenderness or deformity.   Neurological: He is alert and oriented to person, place, and time. He displays normal reflexes. No cranial nerve deficit. Coordination normal.    Psychiatric: He has a normal mood and affect. His behavior is normal. Judgment and thought content normal.   Nursing note and vitals reviewed.      Assessment/Plan   Dirk was seen today for ankle pain-left ankle and sciatica-left leg.    Diagnoses and all orders for this visit:    Sciatica of left side    Acute bilateral ankle pain  -     DULoxetine (CYMBALTA) 30 MG capsule; Take one tablet every 12 hours      Plan: Continue all current treatment as prescribed since he has obtained a benefit with the Cymbalta.  A prescription for #180 with 3 refills was forwarded to his [employer] long-term prescription supplier.     Continue treatment as prescribed.  Will recheck in 6 months for a checkup and review of labs from endocrinology.

## 2018-09-19 ENCOUNTER — OFFICE VISIT (OUTPATIENT)
Dept: ENDOCRINOLOGY | Age: 73
End: 2018-09-19

## 2018-09-19 VITALS
SYSTOLIC BLOOD PRESSURE: 132 MMHG | DIASTOLIC BLOOD PRESSURE: 84 MMHG | HEIGHT: 78 IN | WEIGHT: 315 LBS | BODY MASS INDEX: 36.45 KG/M2

## 2018-09-19 DIAGNOSIS — E55.9 VITAMIN D DEFICIENCY: ICD-10-CM

## 2018-09-19 DIAGNOSIS — E11.65 TYPE 2 DIABETES MELLITUS WITH HYPERGLYCEMIA, WITH LONG-TERM CURRENT USE OF INSULIN (HCC): Primary | ICD-10-CM

## 2018-09-19 DIAGNOSIS — I10 ESSENTIAL HYPERTENSION: ICD-10-CM

## 2018-09-19 DIAGNOSIS — E78.2 MIXED HYPERLIPIDEMIA: ICD-10-CM

## 2018-09-19 DIAGNOSIS — R53.82 CHRONIC FATIGUE: ICD-10-CM

## 2018-09-19 DIAGNOSIS — Z79.4 TYPE 2 DIABETES MELLITUS WITH HYPERGLYCEMIA, WITH LONG-TERM CURRENT USE OF INSULIN (HCC): Primary | ICD-10-CM

## 2018-09-19 PROCEDURE — 99214 OFFICE O/P EST MOD 30 MIN: CPT | Performed by: NURSE PRACTITIONER

## 2018-09-19 NOTE — PROGRESS NOTES
Dirk Atkins  presents to the office  for the follow-up appointment for Type 2 diabetes mellitus.     The diabete's condition location is throughout the system with the  clinical course has fluctuated, the severity is Mild, and the modifying/allievating factors are insulin.  Medications and  Dosages were  reviewed with Dirk Atkins and suggested that compliance most of the time.    The patient reports associated symptoms of hyperglycemia have been faint and associated symptoms of hypoglycemia have been none, with their  hypoglycemia threshold for symptoms is n/a mg/dl .     The patient is currently on insulin.    Compliance with blood glucose monitoring: good.     Meal panning: The patient is using avoidance of concentrated sweets.    The patient is currently taking home blood tests - fasting- 1st thing in morning before eating or drinking  before each meal and 1 or 2 hours after meal  bedtime   basal insulIn  Tresiba U200 50 units in AM     Last instructions given were:  Start Tresiba U200 insulin at 74 units in the morning decrease 2 unit every other day if fasting blood glucoses under  110 mg/Brittany     continue Victoza 18 mg/3 ML titrate as instructed 1 injection daily and Synjardy 5/1000mg  by mouth twice daily     Home blood glucose testing daily: 3-4  FB to 150 mg/dl -(123-130mg/dl)  after lunch 94 to 150 mg/dl  before dinner/supper 100 to 130 mg/dl  bedtime 95 to 150 mg/dl    Last reported blood glucose readings are:  Home blood glucose testing daily: 3-4  FB to 94 mg/dl  before lunch 80 to 120 mg/dl  before dinner/supper 77 to 120 mg/dl  bedtime 70 to 160 mg/dl    Patterns reported per patient are none..        The following portions of the patient's history were reviewed and updated as appropriate: current medications, past family history, past medical history, past social history, past surgical history and problem list.      Current Outpatient Prescriptions:   •  aspirin 325 MG tablet, Take 325  mg by mouth daily., Disp: , Rfl:   •  Blood Glucose Calibration (ACCU-CHEK SMARTVIEW CONTROL VI), 2 (two) times a day., Disp: , Rfl:   •  Blood Glucose Monitoring Suppl (ACCU-CHEK CUBA PLUS) w/Device kit, Use kit to test blood glucose 4 times daily DX: e11.65, Disp: 1 kit, Rfl: 0  •  DULoxetine (CYMBALTA) 30 MG capsule, Take one tablet every 12 hours, Disp: 180 capsule, Rfl: 3  •  Empagliflozin-Metformin HCl (SYNJARDY) 5-1000 MG tablet, Take 5 mg by mouth 2 (Two) Times a Day., Disp: 180 tablet, Rfl: 1  •  finasteride (PROSCAR) 5 MG tablet, TAKE 1 TABLET EVERY DAY, Disp: 90 tablet, Rfl: 0  •  gabapentin (NEURONTIN) 300 MG capsule, Take 1 capsule by mouth 3 (Three) Times a Day., Disp: 270 capsule, Rfl: 0  •  glucose blood (ACCU-CHEK CUBA PLUS) test strip, 1 each by Other route 3 (Three) Times a Day Before Meals. 4x times daily, Disp: 400 each, Rfl: 1  •  Insulin Degludec 200 UNIT/ML solution pen-injector, Inject 54 Units under the skin into the appropriate area as directed Daily. Titrate as instructed with max of 100 units daily, Disp: 9 pen, Rfl: 4  •  Insulin Pen Needle (NOVOFINE) 32G X 6 MM misc, Inject 31 g under the skin into the appropriate area as directed Daily., Disp: 100 each, Rfl: 3  •  isosorbide mononitrate (IMDUR) 30 MG 24 hr tablet, TAKE 1 TABLET EVERY DAY, Disp: 90 tablet, Rfl: 0  •  Lancet Devices (ACCU-CHEK SOFTCLIX) lancets, Test blood glucose 4 times daily dx:e11.65, Disp: 400 each, Rfl: 2  •  Lancets (ACCU-CHEK SOFT TOUCH) lancets, test blood glucose 4 times daily DX: e11.65, Disp: 400 each, Rfl: 1  •  Liraglutide (VICTOZA) 18 MG/3ML solution pen-injector injection, Inject 1.8 mg under the skin into the appropriate area as directed Daily., Disp: 27 mL, Rfl: 0  •  lisinopril (PRINIVIL,ZESTRIL) 2.5 MG tablet, 2.5 mg daily., Disp: , Rfl:   •  metoprolol tartrate (LOPRESSOR) 25 MG tablet, TAKE 1 TABLET TWICE DAILY, Disp: 180 tablet, Rfl: 0  •  nitroglycerin (NITROSTAT) 0.4 MG SL tablet, Place 0.4 mg  under the tongue., Disp: , Rfl:   •  simvastatin (ZOCOR) 40 MG tablet, TAKE 1 TABLET EVERY NIGHT, Disp: 90 tablet, Rfl: 1  •  tamsulosin (FLOMAX) 0.4 MG capsule 24 hr capsule, TAKE 1 CAPSULE EVERY DAY, Disp: 90 capsule, Rfl: 1  •  vitamin D (ERGOCALCIFEROL) 16350 units capsule capsule, 1 tablet By mouth twice weekly, Disp: 24 capsule, Rfl: 1    Patient Active Problem List    Diagnosis   • Sciatica of left side [M54.32]   • Screen for colon cancer [Z12.11]     Overview Note:     Added automatically from request for surgery [id number]     • Chronic fatigue [R53.82]   • Hypersomnia [G47.10]   • Myocardial infarct, old [I25.2]   • Chronic tension headache [G44.229]     Overview Note:     Takes Gagapentin--Sees Neuro.     • B12 deficiency [E53.8]     Overview Note:     Currently using over-the-counter B12 supplements.     • Acute conjunctivitis [H10.30]   • Anxiety [F41.9]   • Coronary arteriosclerosis in native artery [I25.10]   • Edema [R60.9]   • Enlarged prostate [N40.0]   • Mixed hyperlipidemia [E78.2]   • Essential hypertension [I10]   • Leukocytosis [D72.829]   • Palpitations [R00.2]   • Type 2 diabetes mellitus with hyperglycemia, with long-term current use of insulin (CMS/[facility]) [E11.65, Z79.4]     Overview Note:     Dr Hernandez     • Chronic intractable headache [R51]   • Memory impairment [R41.3]   • Obstructive sleep apnea [G47.33]     Overview Note:     Declines C-Pap         Review of Systems   A comprehensive review of the 14 systems was negative except of listed below:  Musculoskeletal:positive for 3 weeks of sciatica pain - resolved at this time.      Objective:     Wt Readings from Last 3 Encounters:   09/19/18 (!) 152 kg (334 lb)   09/12/18 (!) 150 kg (331 lb)   07/23/18 (!) 153 kg (337 lb)     Temp Readings from Last 3 Encounters:   09/12/18 96.5 °F (35.8 °C)   07/23/18 96.3 °F (35.7 °C)   07/17/18 96.2 °F (35.7 °C)     BP Readings from Last 3 Encounters:   09/19/18 132/84   09/12/18 110/60   07/23/18  "130/80     Pulse Readings from Last 3 Encounters:   09/12/18 69   07/23/18 75   07/17/18 69        /84   Ht 198.1 cm (77.99\")   Wt (!) 152 kg (334 lb)   BMI 38.61 kg/m²     General appearance:  alert, cooperative, delirious, fatigued, nourished\" \"appears stated age and cooperative\" \"NAD   Neck: no carotid bruit, supple, symmetrical, trachea midline and thyroid not enlarged, symmetric, no tenderness/mass/nodules   Thyroid:  no palpable nodule, no enlargement, no tenderness   Lung:  \"clear to auscultation bilaterally\" \"no abnormal breath sounds  \" effort was normal, no labored breath, no use of accessory muscles.   Heart: regular rate and rhythm, S1, S2 normal, no murmur, click, rub or gallop      Abdomen:  normal bowel sounds- 4 quads, soft non-tender, contour - rounded and contour - obese      Extremities: extremities normal, atraumatic, no cyanosis or edema extremities normal, atraumatic, no cyanosis or edema\" WNL - gait and station, strength and stability\"       Skin:   Pulses:  warm and dry, no hyperpigmentation, normal skin coloring, or suspicious lesions   2+ and symmetric   Neuro: Alert and oriented x3. Gait normal. Reflexes and motor strength normal and symmetric. Cranial nerves 2-12 and sensation grossly intact.      Psych: behavior - normal, judgement - normal, mood - normal, affect - normal                 Lab Review  Results for orders placed or performed in visit on 09/05/18   Comprehensive Metabolic Panel   Result Value Ref Range    Glucose 119 (H) 65 - 99 mg/dL    BUN 12 8 - 23 mg/dL    Creatinine 0.98 0.76 - 1.27 mg/dL    eGFR Non African Am 75 >60 mL/min/1.73    eGFR African Am 91 >60 mL/min/1.73    BUN/Creatinine Ratio 12.2 7.0 - 25.0    Sodium 140 136 - 145 mmol/L    Potassium 5.0 3.5 - 5.2 mmol/L    Chloride 100 98 - 107 mmol/L    Total CO2 25.5 22.0 - 29.0 mmol/L    Calcium 9.5 8.6 - 10.5 mg/dL    Total Protein 6.7 6.0 - 8.5 g/dL    Albumin 4.70 3.50 - 5.20 g/dL    Globulin 2.0 gm/dL    " A/G Ratio 2.4 g/dL    Total Bilirubin 0.4 0.1 - 1.2 mg/dL    Alkaline Phosphatase 68 39 - 117 U/L    AST (SGOT) 10 1 - 40 U/L    ALT (SGPT) 12 1 - 41 U/L   C-Peptide   Result Value Ref Range    C-Peptide 6.6 (H) 1.1 - 4.4 ng/mL   Hemoglobin A1c   Result Value Ref Range    Hemoglobin A1C 5.58 4.80 - 5.60 %   Insulin, Total   Result Value Ref Range    Insulin 25.6 (H) 2.6 - 24.9 uIU/mL   Lipid Panel   Result Value Ref Range    Total Cholesterol 119 0 - 200 mg/dL    Triglycerides 139 0 - 150 mg/dL    HDL Cholesterol 30 (L) 40 - 60 mg/dL    VLDL Cholesterol 27.8 5 - 40 mg/dL    LDL Cholesterol  61 0 - 100 mg/dL   Uric Acid   Result Value Ref Range    Uric Acid 5.5 3.4 - 7.0 mg/dL   Vitamin D 25 Hydroxy   Result Value Ref Range    25 Hydroxy, Vitamin D 113.4 (C) 30.0 - 100.0 ng/ml   TSH   Result Value Ref Range    TSH 1.030 0.270 - 4.200 mIU/mL   T4, Free   Result Value Ref Range    Free T4 0.99 0.93 - 1.70 ng/dL   Microalbumin / Creatinine Urine Ratio   Result Value Ref Range    Creatinine, Urine 95.1 Not Estab. mg/dL    Microalbumin, Urine 4.0 Not Estab. ug/mL    Microalbumin/Creatinine Ratio 4.2 0.0 - 30.0 mg/g creat   Cardiovascular Risk Assessment   Result Value Ref Range    Interpretation Note    Diabetes Patient Education   Result Value Ref Range    PDF Image Not applicable            Assessment:   Dirk was seen today for follow-up.    Diagnoses and all orders for this visit:    Type 2 diabetes mellitus with hyperglycemia, with long-term current use of insulin (CMS/[facility])  -     Liraglutide (VICTOZA) 18 MG/3ML solution pen-injector injection; Inject 1.8 mg under the skin into the appropriate area as directed Daily.  -     Insulin Degludec 200 UNIT/ML solution pen-injector; Inject 54 Units under the skin into the appropriate area as directed Daily. Titrate as instructed with max of 100 units daily  -     Empagliflozin-Metformin HCl (SYNJARDY) 5-1000 MG tablet; Take 5 mg by mouth 2 (Two) Times a Day.  -     glucose  blood (ACCU-CHEK CUBA PLUS) test strip; 1 each by Other route 3 (Three) Times a Day Before Meals. 4x times daily  -     Insulin Pen Needle (NOVOFINE) 32G X 6 MM misc; Inject 31 g under the skin into the appropriate area as directed Daily.  -     Fructosamine; Future  -     Comprehensive Metabolic Panel; Future  -     Hemoglobin A1c; Future  -     Insulin, Total; Future  -     Lipid Panel; Future  -     Microalbumin / Creatinine Urine Ratio - Urine, Clean Catch; Future  -     Uric Acid; Future  -     Vitamin D 25 Hydroxy; Future    Essential hypertension  -     Comprehensive Metabolic Panel; Future    Mixed hyperlipidemia  -     Comprehensive Metabolic Panel; Future  -     Lipid Panel; Future    Chronic fatigue  -     Comprehensive Metabolic Panel; Future    Vitamin D deficiency  -     Comprehensive Metabolic Panel; Future  -     Vitamin D 25 Hydroxy; Future          Plan:   In summary:   3 weeks of sciatica pain - placed on steroids, IBU 800mg TID and Neurontin, has been doing exercises - with relief.    Medication changes:   Start Tresiba U200 insulin at 52 units in the morning decrease 2 unit every other day if fasting blood glucoses under  110 mg/Brittany    continue Victoza 18 mg/3 ML titrate as instructed 1 injection daily and Synjardy 5/1000mg  by mouth twice daily     Hold the vitamin D 3 weeks then on fourth week start back at 1 by mouth weekly    Additional instructions:  home blood tests -  Blood glucose testin times daily, that are: fasting- 1st thing in morning before eating or drinking, before each meal and 1 or 2 hours after meal  Bedtime, anytime you feel symptoms of hyperglycemia or hypoglycemia (high or low blood sugars)        Education:  interpretation of lab results, blood sugar goals, complications of diabetes mellitus, hypoglycemia prevention and treatment, exercise, illness management, self-monitoring of blood glucose skills, nutrition and carbohydrate counting        The total face to face  time spent was  25 minutes  with additional education given: 14 minutes (greater than 50% of the total time) was spent with counseling and coordination of care on: SMBG with goals, Side effects profiles with medications, medication use and purposes    Return in about 3 months (around 12/19/2018), or if symptoms worsen or fail to improve, for Recheck.      Dragon transcription disclaimer     Much of this encounter note is an electronic transcription/translation of spoken language to printed text. The electronic translation of spoken language may permit erroneous, or at times, nonsensical words or phrases to be inadvertently transcribed. Although I have reviewed the note for such errors, some may still exist.

## 2018-09-19 NOTE — PATIENT INSTRUCTIONS
Start Tresiba U200 insulin at 52 units in the morning decrease 2 unit every other day if fasting blood glucoses under  110 mg/Brittany    continue Victoza 18 mg/3 ML titrate as instructed 1 injection daily and Synjardy 5/1000mg  by mouth twice daily       home blood tests -  Blood glucose testin times daily, that are: fasting- 1st thing in morning before eating or drinking, before each meal and 1 or 2 hours after meal  Bedtime, anytime you feel symptoms of hyperglycemia or hypoglycemia (high or low blood sugars)    Hold the vitamin D 3 weeks then on fourth week start back at 1 by mouth weekly

## 2018-10-11 DIAGNOSIS — I25.10 CORONARY ARTERIOSCLEROSIS: ICD-10-CM

## 2018-10-11 DIAGNOSIS — I10 ESSENTIAL HYPERTENSION: ICD-10-CM

## 2018-10-11 RX ORDER — ISOSORBIDE MONONITRATE 30 MG/1
TABLET, EXTENDED RELEASE ORAL
Qty: 90 TABLET | Refills: 0 | Status: SHIPPED | OUTPATIENT
Start: 2018-10-11 | End: 2019-01-25 | Stop reason: SDUPTHER

## 2018-10-18 DIAGNOSIS — E11.65 TYPE 2 DIABETES MELLITUS WITH HYPERGLYCEMIA, WITH LONG-TERM CURRENT USE OF INSULIN (HCC): ICD-10-CM

## 2018-10-18 DIAGNOSIS — Z79.4 TYPE 2 DIABETES MELLITUS WITH HYPERGLYCEMIA, WITH LONG-TERM CURRENT USE OF INSULIN (HCC): ICD-10-CM

## 2018-10-24 ENCOUNTER — TELEPHONE (OUTPATIENT)
Dept: ENDOCRINOLOGY | Age: 73
End: 2018-10-24

## 2018-11-21 RX ORDER — FINASTERIDE 5 MG/1
TABLET, FILM COATED ORAL
Qty: 90 TABLET | Refills: 0 | Status: SHIPPED | OUTPATIENT
Start: 2018-11-21 | End: 2019-01-25 | Stop reason: SDUPTHER

## 2018-12-19 ENCOUNTER — RESULTS ENCOUNTER (OUTPATIENT)
Dept: ENDOCRINOLOGY | Age: 73
End: 2018-12-19

## 2018-12-19 DIAGNOSIS — R53.82 CHRONIC FATIGUE: ICD-10-CM

## 2018-12-19 DIAGNOSIS — E55.9 VITAMIN D DEFICIENCY: ICD-10-CM

## 2018-12-19 DIAGNOSIS — I10 ESSENTIAL HYPERTENSION: ICD-10-CM

## 2018-12-19 DIAGNOSIS — Z79.4 TYPE 2 DIABETES MELLITUS WITH HYPERGLYCEMIA, WITH LONG-TERM CURRENT USE OF INSULIN (HCC): ICD-10-CM

## 2018-12-19 DIAGNOSIS — E11.65 TYPE 2 DIABETES MELLITUS WITH HYPERGLYCEMIA, WITH LONG-TERM CURRENT USE OF INSULIN (HCC): ICD-10-CM

## 2018-12-19 DIAGNOSIS — E78.2 MIXED HYPERLIPIDEMIA: ICD-10-CM

## 2019-01-01 DIAGNOSIS — E78.2 MIXED HYPERLIPIDEMIA: ICD-10-CM

## 2019-01-02 DIAGNOSIS — E78.2 MIXED HYPERLIPIDEMIA: ICD-10-CM

## 2019-01-02 DIAGNOSIS — E55.9 VITAMIN D DEFICIENCY, UNSPECIFIED: ICD-10-CM

## 2019-01-02 DIAGNOSIS — E11.65 TYPE 2 DIABETES MELLITUS WITH HYPERGLYCEMIA, WITH LONG-TERM CURRENT USE OF INSULIN (HCC): Primary | ICD-10-CM

## 2019-01-02 DIAGNOSIS — Z79.4 TYPE 2 DIABETES MELLITUS WITH HYPERGLYCEMIA, WITH LONG-TERM CURRENT USE OF INSULIN (HCC): Primary | ICD-10-CM

## 2019-01-02 RX ORDER — SIMVASTATIN 40 MG
TABLET ORAL
Qty: 90 TABLET | Refills: 1 | Status: SHIPPED | OUTPATIENT
Start: 2019-01-02 | End: 2019-01-16

## 2019-01-07 ENCOUNTER — TELEPHONE (OUTPATIENT)
Dept: INTERNAL MEDICINE | Age: 74
End: 2019-01-07

## 2019-01-08 RX ORDER — GABAPENTIN 300 MG/1
CAPSULE ORAL
Qty: 270 CAPSULE | Refills: 0 | OUTPATIENT
Start: 2019-01-08

## 2019-01-08 RX ORDER — GABAPENTIN 300 MG/1
300 CAPSULE ORAL 3 TIMES DAILY
Qty: 270 CAPSULE | Refills: 0 | Status: SHIPPED | OUTPATIENT
Start: 2019-01-08 | End: 2019-05-03 | Stop reason: SDUPTHER

## 2019-01-09 ENCOUNTER — LAB (OUTPATIENT)
Dept: ENDOCRINOLOGY | Age: 74
End: 2019-01-09

## 2019-01-09 DIAGNOSIS — E11.65 TYPE 2 DIABETES MELLITUS WITH HYPERGLYCEMIA, WITH LONG-TERM CURRENT USE OF INSULIN (HCC): ICD-10-CM

## 2019-01-09 DIAGNOSIS — E78.2 MIXED HYPERLIPIDEMIA: ICD-10-CM

## 2019-01-09 DIAGNOSIS — Z79.4 TYPE 2 DIABETES MELLITUS WITH HYPERGLYCEMIA, WITH LONG-TERM CURRENT USE OF INSULIN (HCC): ICD-10-CM

## 2019-01-09 DIAGNOSIS — E55.9 VITAMIN D DEFICIENCY, UNSPECIFIED: ICD-10-CM

## 2019-01-10 LAB
25(OH)D3+25(OH)D2 SERPL-MCNC: 90.7 NG/ML (ref 30–100)
ALBUMIN SERPL-MCNC: 4.5 G/DL (ref 3.5–5.2)
ALBUMIN/GLOB SERPL: 1.8 G/DL
ALP SERPL-CCNC: 69 U/L (ref 39–117)
ALT SERPL-CCNC: 10 U/L (ref 1–41)
AST SERPL-CCNC: 12 U/L (ref 1–40)
BILIRUB SERPL-MCNC: 0.6 MG/DL (ref 0.1–1.2)
BUN SERPL-MCNC: 10 MG/DL (ref 8–23)
BUN/CREAT SERPL: 11.1 (ref 7–25)
C PEPTIDE SERPL-MCNC: 2.3 NG/ML (ref 1.1–4.4)
CALCIUM SERPL-MCNC: 9.8 MG/DL (ref 8.6–10.5)
CHLORIDE SERPL-SCNC: 98 MMOL/L (ref 98–107)
CHOLEST SERPL-MCNC: 142 MG/DL (ref 0–200)
CO2 SERPL-SCNC: 28.2 MMOL/L (ref 22–29)
CREAT SERPL-MCNC: 0.9 MG/DL (ref 0.76–1.27)
GLOBULIN SER CALC-MCNC: 2.5 GM/DL
GLUCOSE SERPL-MCNC: 117 MG/DL (ref 65–99)
HBA1C MFR BLD: 6.02 % (ref 4.8–5.6)
HDLC SERPL-MCNC: 36 MG/DL (ref 40–60)
INTERPRETATION: NORMAL
LDLC SERPL CALC-MCNC: 81 MG/DL (ref 0–100)
Lab: NORMAL
MICROALBUMIN UR-MCNC: 4.2 UG/ML
POTASSIUM SERPL-SCNC: 5.4 MMOL/L (ref 3.5–5.2)
PROT SERPL-MCNC: 7 G/DL (ref 6–8.5)
SODIUM SERPL-SCNC: 137 MMOL/L (ref 136–145)
TRIGL SERPL-MCNC: 125 MG/DL (ref 0–150)
VLDLC SERPL CALC-MCNC: 25 MG/DL (ref 5–40)

## 2019-01-16 ENCOUNTER — OFFICE VISIT (OUTPATIENT)
Dept: ENDOCRINOLOGY | Age: 74
End: 2019-01-16

## 2019-01-16 VITALS
BODY MASS INDEX: 36.45 KG/M2 | DIASTOLIC BLOOD PRESSURE: 74 MMHG | HEIGHT: 78 IN | SYSTOLIC BLOOD PRESSURE: 118 MMHG | WEIGHT: 315 LBS

## 2019-01-16 DIAGNOSIS — E55.9 VITAMIN D DEFICIENCY, UNSPECIFIED: ICD-10-CM

## 2019-01-16 DIAGNOSIS — Z79.4 TYPE 2 DIABETES MELLITUS WITH HYPERGLYCEMIA, WITH LONG-TERM CURRENT USE OF INSULIN (HCC): Primary | ICD-10-CM

## 2019-01-16 DIAGNOSIS — E11.65 TYPE 2 DIABETES MELLITUS WITH HYPERGLYCEMIA, WITH LONG-TERM CURRENT USE OF INSULIN (HCC): Primary | ICD-10-CM

## 2019-01-16 DIAGNOSIS — E78.2 MIXED HYPERLIPIDEMIA: ICD-10-CM

## 2019-01-16 DIAGNOSIS — I10 ESSENTIAL HYPERTENSION: ICD-10-CM

## 2019-01-16 DIAGNOSIS — E55.9 VITAMIN D DEFICIENCY: ICD-10-CM

## 2019-01-16 PROCEDURE — 99214 OFFICE O/P EST MOD 30 MIN: CPT | Performed by: NURSE PRACTITIONER

## 2019-01-16 RX ORDER — ROSUVASTATIN CALCIUM 40 MG/1
40 TABLET, COATED ORAL DAILY
Qty: 90 TABLET | Refills: 1 | Status: SHIPPED | OUTPATIENT
Start: 2019-01-16 | End: 2019-04-24 | Stop reason: SDUPTHER

## 2019-01-16 RX ORDER — ERGOCALCIFEROL 1.25 MG/1
CAPSULE ORAL
Qty: 24 CAPSULE | Refills: 1 | Status: SHIPPED | OUTPATIENT
Start: 2019-01-16 | End: 2019-07-02 | Stop reason: SDUPTHER

## 2019-01-16 RX ORDER — FLASH GLUCOSE SENSOR
1 KIT MISCELLANEOUS CONTINUOUS
Qty: 3 EACH | Refills: 6 | Status: SHIPPED | OUTPATIENT
Start: 2019-01-16 | End: 2019-01-30

## 2019-01-16 RX ORDER — FLASH GLUCOSE SENSOR
1 KIT MISCELLANEOUS ONCE
Qty: 1 DEVICE | Refills: 0 | Status: SHIPPED | OUTPATIENT
Start: 2019-01-16 | End: 2019-01-16

## 2019-01-16 NOTE — PROGRESS NOTES
Dirk Atkins  presents to the office  for the follow-up appointment for Type 2 diabetes mellitus.     The diabete's condition location is throughout the system with the  clinical course has fluctuated, the severity is Mild, and the modifying/allievating factors are insulin.  Medications and  Dosages were  reviewed with Dirk Atkins and suggested that compliance most of the time.    The patient reports associated symptoms of hyperglycemia have been faint and associated symptoms of hypoglycemia have been none, with their  hypoglycemia threshold for symptoms is n/a mg/dl .     The patient is currently on insulin.      Compliance with blood glucose monitoring: good.     Meal panning: The patient is using avoidance of concentrated sweets.    The patient is currently taking home blood tests - Blood glucose testin-3 times daily, that are:  fasting- 1st thing in morning before eating or drinking  before each meal   basal insulIn  Tresiba U200 72 units in AM     Last instructions given were:  Start Tresiba U200 insulin at 52 units in the morning decrease 2 unit every other day if fasting blood glucoses under  110 mg/Brittany     continue Victoza 18 mg/3 ML titrate as instructed 1 injection daily and Synjardy 5/1000mg  by mouth twice daily      Hold the vitamin D 3 weeks then on fourth week start back at 1 by mouth weekly    Home blood glucose testing daily: 1-3  FB to 265 mg/dl- average last 4-5 days   before lunch 76 to 150 mg/dl average 78-90s  before dinner/supper 78 to 130 mg/dl - average  mg/dl    Last reported blood glucose readings are:  Home blood glucose testing daily: 3-4  FB to 150 mg/dl -(123-130mg/dl)  after lunch 94 to 150 mg/dl  before dinner/supper 100 to 130 mg/dl  bedtime 95 to 150 mg/dl    Patterns reported per patient are none.         The following portions of the patient's history were reviewed and updated as appropriate: current medications, past family history, past medical  history, past social history, past surgical history and problem list.      Current Outpatient Medications:   •  aspirin 325 MG tablet, Take 325 mg by mouth daily., Disp: , Rfl:   •  Blood Glucose Calibration (ACCU-CHEK SMARTVIEW CONTROL VI), 2 (two) times a day., Disp: , Rfl:   •  Blood Glucose Monitoring Suppl (ACCU-CHEK CUBA PLUS) w/Device kit, Use kit to test blood glucose 4 times daily DX: e11.65, Disp: 1 kit, Rfl: 0  •  DULoxetine (CYMBALTA) 30 MG capsule, Take one tablet every 12 hours, Disp: 180 capsule, Rfl: 3  •  Empagliflozin-Metformin HCl (SYNJARDY) 5-1000 MG tablet, Take 5 mg by mouth 2 (Two) Times a Day., Disp: 180 tablet, Rfl: 1  •  finasteride (PROSCAR) 5 MG tablet, TAKE 1 TABLET EVERY DAY, Disp: 90 tablet, Rfl: 0  •  gabapentin (NEURONTIN) 300 MG capsule, Take 1 capsule by mouth 3 (Three) Times a Day., Disp: 270 capsule, Rfl: 0  •  glucose blood (ACCU-CHEK CUBA PLUS) test strip, Use to test BG 4 times daily. DX Code:e11.65, Disp: 400 each, Rfl: 1  •  Insulin Degludec 200 UNIT/ML solution pen-injector, Inject 54 Units under the skin into the appropriate area as directed Daily. Titrate as instructed with max of 100 units daily, Disp: 9 pen, Rfl: 4  •  Insulin Pen Needle (NOVOFINE) 32G X 6 MM misc, Inject 31 g under the skin into the appropriate area as directed Daily., Disp: 100 each, Rfl: 3  •  isosorbide mononitrate (IMDUR) 30 MG 24 hr tablet, TAKE 1 TABLET EVERY DAY, Disp: 90 tablet, Rfl: 0  •  Lancet Devices (ACCU-CHEK SOFTCLIX) lancets, Test blood glucose 4 times daily dx:e11.65, Disp: 400 each, Rfl: 2  •  Lancets (ACCU-CHEK SOFT TOUCH) lancets, test blood glucose 4 times daily DX: e11.65, Disp: 400 each, Rfl: 1  •  Liraglutide (VICTOZA) 18 MG/3ML solution pen-injector injection, Inject 1.8 mg under the skin into the appropriate area as directed Daily., Disp: 27 mL, Rfl: 0  •  lisinopril (PRINIVIL,ZESTRIL) 2.5 MG tablet, 2.5 mg daily., Disp: , Rfl:   •  metoprolol tartrate (LOPRESSOR) 25 MG  tablet, TAKE 1 TABLET TWICE DAILY, Disp: 180 tablet, Rfl: 0  •  nitroglycerin (NITROSTAT) 0.4 MG SL tablet, Place 0.4 mg under the tongue., Disp: , Rfl:   •  tamsulosin (FLOMAX) 0.4 MG capsule 24 hr capsule, TAKE 1 CAPSULE EVERY DAY, Disp: 90 capsule, Rfl: 1  •  vitamin D (ERGOCALCIFEROL) 01673 units capsule capsule, 1 tablet By mouth twice weekly, Disp: 24 capsule, Rfl: 1  •  Continuous Blood Gluc  (FREESTYLE JORGE READER) device, 1 Device 1 (One) Time for 1 dose., Disp: 1 Device, Rfl: 0  •  Continuous Blood Gluc Sensor (FREESTYLE JORGE SENSOR SYSTEM), 1 Device Continuous for 14 days. One sensor every 14 days, Disp: 3 each, Rfl: 6  •  rosuvastatin (CRESTOR) 40 MG tablet, Take 1 tablet by mouth Daily., Disp: 90 tablet, Rfl: 1    Patient Active Problem List    Diagnosis   • Vitamin D deficiency, unspecified [E55.9]   • Sciatica of left side [M54.32]   • Screen for colon cancer [Z12.11]   • Chronic fatigue [R53.82]   • Hypersomnia [G47.10]   • Myocardial infarct, old [I25.2]   • Chronic tension headache [G44.229]   • B12 deficiency [E53.8]   • Acute conjunctivitis [H10.30]   • Anxiety [F41.9]   • Coronary arteriosclerosis in native artery [I25.10]   • Edema [R60.9]   • Enlarged prostate [N40.0]   • Mixed hyperlipidemia [E78.2]   • Essential hypertension [I10]   • Leukocytosis [D72.829]   • Palpitations [R00.2]   • Type 2 diabetes mellitus with hyperglycemia, with long-term current use of insulin (CMS/[facility]) [E11.65, Z79.4]   • Chronic intractable headache [R51]   • Memory impairment [R41.3]   • Obstructive sleep apnea [G47.33]       Review of Systems   A comprehensive review of systems was negative.- 14 systems reviewed.        Objective:     Wt Readings from Last 3 Encounters:   01/16/19 (!) 156 kg (345 lb)   09/19/18 (!) 152 kg (334 lb)   09/12/18 (!) 150 kg (331 lb)     Temp Readings from Last 3 Encounters:   09/12/18 96.5 °F (35.8 °C)   07/23/18 96.3 °F (35.7 °C)   07/17/18 96.2 °F (35.7 °C)     BP Readings  "from Last 3 Encounters:   01/16/19 118/74   09/19/18 132/84   09/12/18 110/60     Pulse Readings from Last 3 Encounters:   09/12/18 69   07/23/18 75   07/17/18 69        /74   Ht 198.1 cm (77.99\")   Wt (!) 156 kg (345 lb)   BMI 39.88 kg/m²        Physical Exam   Constitutional: He is oriented to person, place, and time. He appears well-developed and well-nourished. No distress.   HENT:   Head: Normocephalic and atraumatic.   Eyes: EOM are normal. Pupils are equal, round, and reactive to light.   Neck: Normal range of motion. Neck supple. No thyromegaly present.   Cardiovascular: Normal rate, regular rhythm, normal heart sounds and intact distal pulses.   No murmur heard.  Pulmonary/Chest: Effort normal and breath sounds normal.   Abdominal: Soft. Bowel sounds are normal.   Musculoskeletal: Normal range of motion.   Neurological: He is alert and oriented to person, place, and time.   Skin: Skin is warm and dry. Capillary refill takes 2 to 3 seconds. He is not diaphoretic.   Psychiatric: He has a normal mood and affect. His behavior is normal. Judgment and thought content normal.   Nursing note and vitals reviewed.          Lab Review  Results for orders placed or performed in visit on 01/09/19   Vitamin D 25 Hydroxy   Result Value Ref Range    25 Hydroxy, Vitamin D 90.7 30.0 - 100.0 ng/ml   MicroAlbumin, Urine, Random - Urine, Clean Catch   Result Value Ref Range    Microalbumin, Urine 4.2 Not Estab. ug/mL   Hemoglobin A1c   Result Value Ref Range    Hemoglobin A1C 6.02 (H) 4.80 - 5.60 %   C-Peptide   Result Value Ref Range    C-Peptide 2.3 1.1 - 4.4 ng/mL   Comprehensive Metabolic Panel   Result Value Ref Range    Glucose 117 (H) 65 - 99 mg/dL    BUN 10 8 - 23 mg/dL    Creatinine 0.90 0.76 - 1.27 mg/dL    eGFR Non African Am 83 >60 mL/min/1.73    eGFR African Am 100 >60 mL/min/1.73    BUN/Creatinine Ratio 11.1 7.0 - 25.0    Sodium 137 136 - 145 mmol/L    Potassium 5.4 (H) 3.5 - 5.2 mmol/L    Chloride 98 98 " - 107 mmol/L    Total CO2 28.2 22.0 - 29.0 mmol/L    Calcium 9.8 8.6 - 10.5 mg/dL    Total Protein 7.0 6.0 - 8.5 g/dL    Albumin 4.50 3.50 - 5.20 g/dL    Globulin 2.5 gm/dL    A/G Ratio 1.8 g/dL    Total Bilirubin 0.6 0.1 - 1.2 mg/dL    Alkaline Phosphatase 69 39 - 117 U/L    AST (SGOT) 12 1 - 40 U/L    ALT (SGPT) 10 1 - 41 U/L   Lipid Panel   Result Value Ref Range    Total Cholesterol 142 0 - 200 mg/dL    Triglycerides 125 0 - 150 mg/dL    HDL Cholesterol 36 (L) 40 - 60 mg/dL    VLDL Cholesterol 25 5 - 40 mg/dL    LDL Cholesterol  81 0 - 100 mg/dL   Cardiovascular Risk Assessment   Result Value Ref Range    Interpretation Note    Diabetes Patient Education   Result Value Ref Range    PDF Image Not applicable            Assessment:   Dirk was seen today for follow-up.    Diagnoses and all orders for this visit:    Type 2 diabetes mellitus with hyperglycemia, with long-term current use of insulin (CMS/[facility])  -     Continuous Blood Gluc  (FREESTYLE JORGE READER) device; 1 Device 1 (One) Time for 1 dose.  -     Continuous Blood Gluc Sensor (FREESTYLE JORGE SENSOR SYSTEM); 1 Device Continuous for 14 days. One sensor every 14 days  -     Empagliflozin-Metformin HCl (SYNJARDY) 5-1000 MG tablet; Take 5 mg by mouth 2 (Two) Times a Day.  -     Insulin Degludec 200 UNIT/ML solution pen-injector; Inject 54 Units under the skin into the appropriate area as directed Daily. Titrate as instructed with max of 100 units daily  -     Liraglutide (VICTOZA) 18 MG/3ML solution pen-injector injection; Inject 1.8 mg under the skin into the appropriate area as directed Daily.  -     Fructosamine; Future  -     Comprehensive Metabolic Panel; Future  -     Hemoglobin A1c; Future  -     Lipid Panel; Future  -     Microalbumin / Creatinine Urine Ratio - Urine, Clean Catch; Future  -     Uric Acid; Future  -     Vitamin D 25 Hydroxy; Future  -     TSH; Future  -     T4; Future  -     T3; Future    Mixed hyperlipidemia  -      rosuvastatin (CRESTOR) 40 MG tablet; Take 1 tablet by mouth Daily.  -     Comprehensive Metabolic Panel; Future  -     Lipid Panel; Future    Vitamin D deficiency, unspecified  -     Comprehensive Metabolic Panel; Future  -     Vitamin D 25 Hydroxy; Future    Essential hypertension  -     Comprehensive Metabolic Panel; Future    Vitamin D deficiency  -     vitamin D (ERGOCALCIFEROL) 97614 units capsule capsule; 1 tablet By mouth twice weekly  -     Comprehensive Metabolic Panel; Future          Plan:   In summary/ Medication changes: I met with this patient is metabolically stable and doing well at this time.  Laboratory testing was reviewed dated 1.19.2019, discussed with questions and answers completed.  Discussed and formulate a treatment plan with patient, patient verbally stated understood all instructions.       Type 2 diabetes mellitus with hyperglycemia, with long-term current use of insulin- chronic, stable no medication changes at this time. Refills prescribed. Future labs ordered for upcoming appointment and assessment. We will order the [employer] system to assist in SMBG    Mixed hyperlipidemia - chronic,uncontrolled with the LDLs. Medication changes:  Stop the zocor    Start Crestor 40mg nightly.      Vitamin D deficiency, unspecified- chronic, stable no medication changes at this time. Refills prescribed. Future labs ordered for upcoming appointment and assessment.       Essential hypertension- chronic, stable no medication changes at this time. Refills prescribed. Future labs ordered for upcoming appointment and assessment.        home blood tests -  Blood glucose testing: 3 times daily, that are:  fasting- 1st thing in morning before eating or drinking  before each meal and 1 or 2 hours after meal  bedtime  anytime you feel symptoms of hyperglycemia or hypoglycemia (high or low blood sugars)        Education:  interpretation of lab results, blood sugar goals, complications of diabetes mellitus,  hypoglycemia prevention and treatment, exercise, illness management, self-monitoring of blood glucose skills, nutrition, carbohydrate counting and site rotation        The total face to face time spent was  25 minutes  with additional education given: 14 minutes (greater than 50% of the total time) was spent with counseling and coordination of care on: SMBG with goals, Side effects profiles with medications, medication use and purposes,     Return in about 3 months (around 4/16/2019), or if symptoms worsen or fail to improve, for Recheck. 3 months with Heather-2 weeks prior for labs 6 months with Dr. Savage-2 weeks prior for labs      Dragon transcription disclaimer     Much of this encounter note is an electronic transcription/translation of spoken language to printed text. The electronic translation of spoken language may permit erroneous, or at times, nonsensical words or phrases to be inadvertently transcribed. Although I have reviewed the note for such errors, some may still exist.

## 2019-01-25 DIAGNOSIS — I10 ESSENTIAL HYPERTENSION: ICD-10-CM

## 2019-01-25 DIAGNOSIS — I25.10 CORONARY ARTERIOSCLEROSIS: ICD-10-CM

## 2019-01-25 RX ORDER — FINASTERIDE 5 MG/1
TABLET, FILM COATED ORAL
Qty: 90 TABLET | Refills: 0 | Status: SHIPPED | OUTPATIENT
Start: 2019-01-25 | End: 2019-05-29 | Stop reason: SDUPTHER

## 2019-01-25 RX ORDER — ISOSORBIDE MONONITRATE 30 MG/1
TABLET, EXTENDED RELEASE ORAL
Qty: 90 TABLET | Refills: 0 | Status: SHIPPED | OUTPATIENT
Start: 2019-01-25 | End: 2019-05-29 | Stop reason: SDUPTHER

## 2019-02-01 DIAGNOSIS — N40.0 ENLARGED PROSTATE WITHOUT LOWER URINARY TRACT SYMPTOMS (LUTS): ICD-10-CM

## 2019-02-01 RX ORDER — TAMSULOSIN HYDROCHLORIDE 0.4 MG/1
CAPSULE ORAL
Qty: 90 CAPSULE | Refills: 1 | Status: SHIPPED | OUTPATIENT
Start: 2019-02-01 | End: 2019-10-01 | Stop reason: SDUPTHER

## 2019-04-10 ENCOUNTER — LAB (OUTPATIENT)
Dept: ENDOCRINOLOGY | Age: 74
End: 2019-04-10

## 2019-04-10 DIAGNOSIS — E78.2 MIXED HYPERLIPIDEMIA: ICD-10-CM

## 2019-04-10 DIAGNOSIS — Z79.4 TYPE 2 DIABETES MELLITUS WITH HYPERGLYCEMIA, WITH LONG-TERM CURRENT USE OF INSULIN (HCC): ICD-10-CM

## 2019-04-10 DIAGNOSIS — E11.65 TYPE 2 DIABETES MELLITUS WITH HYPERGLYCEMIA, WITH LONG-TERM CURRENT USE OF INSULIN (HCC): ICD-10-CM

## 2019-04-10 DIAGNOSIS — E55.9 VITAMIN D DEFICIENCY: ICD-10-CM

## 2019-04-10 DIAGNOSIS — I10 ESSENTIAL HYPERTENSION: ICD-10-CM

## 2019-04-10 DIAGNOSIS — E55.9 VITAMIN D DEFICIENCY, UNSPECIFIED: ICD-10-CM

## 2019-04-11 LAB
25(OH)D3+25(OH)D2 SERPL-MCNC: 73 NG/ML (ref 30–100)
ALBUMIN SERPL-MCNC: 4.8 G/DL (ref 3.5–5.2)
ALBUMIN/CREAT UR: <9.3 MG/G CREAT (ref 0–30)
ALBUMIN/GLOB SERPL: 1.9 G/DL
ALP SERPL-CCNC: 73 U/L (ref 39–117)
ALT SERPL-CCNC: 13 U/L (ref 1–41)
AST SERPL-CCNC: 16 U/L (ref 1–40)
BILIRUB SERPL-MCNC: 0.8 MG/DL (ref 0.2–1.2)
BUN SERPL-MCNC: 12 MG/DL (ref 8–23)
BUN/CREAT SERPL: 13 (ref 7–25)
CALCIUM SERPL-MCNC: 9.9 MG/DL (ref 8.6–10.5)
CHLORIDE SERPL-SCNC: 101 MMOL/L (ref 98–107)
CHOLEST SERPL-MCNC: 117 MG/DL (ref 0–200)
CO2 SERPL-SCNC: 26.6 MMOL/L (ref 22–29)
CREAT SERPL-MCNC: 0.92 MG/DL (ref 0.76–1.27)
CREAT UR-MCNC: 32.1 MG/DL
FRUCTOSAMINE SERPL-SCNC: 233 UMOL/L (ref 0–285)
GLOBULIN SER CALC-MCNC: 2.5 GM/DL
GLUCOSE SERPL-MCNC: 88 MG/DL (ref 65–99)
HBA1C MFR BLD: 6 % (ref 4.8–5.6)
HDLC SERPL-MCNC: 35 MG/DL (ref 40–60)
INTERPRETATION: NORMAL
LDLC SERPL CALC-MCNC: 57 MG/DL (ref 0–100)
Lab: NORMAL
MICROALBUMIN UR-MCNC: <3 UG/ML
POTASSIUM SERPL-SCNC: 4.5 MMOL/L (ref 3.5–5.2)
PROT SERPL-MCNC: 7.3 G/DL (ref 6–8.5)
SODIUM SERPL-SCNC: 140 MMOL/L (ref 136–145)
T3 SERPL-MCNC: 110 NG/DL (ref 80–200)
T4 SERPL-MCNC: 6.24 MCG/DL (ref 4.5–11.7)
TRIGL SERPL-MCNC: 124 MG/DL (ref 0–150)
TSH SERPL DL<=0.005 MIU/L-ACNC: 1.31 MIU/ML (ref 0.27–4.2)
URATE SERPL-MCNC: 5.2 MG/DL (ref 3.4–7)
VLDLC SERPL CALC-MCNC: 24.8 MG/DL

## 2019-04-24 ENCOUNTER — OFFICE VISIT (OUTPATIENT)
Dept: ENDOCRINOLOGY | Age: 74
End: 2019-04-24

## 2019-04-24 VITALS
WEIGHT: 315 LBS | HEIGHT: 78 IN | BODY MASS INDEX: 36.45 KG/M2 | SYSTOLIC BLOOD PRESSURE: 124 MMHG | DIASTOLIC BLOOD PRESSURE: 78 MMHG

## 2019-04-24 DIAGNOSIS — E78.2 MIXED HYPERLIPIDEMIA: ICD-10-CM

## 2019-04-24 DIAGNOSIS — E11.65 TYPE 2 DIABETES MELLITUS WITH HYPERGLYCEMIA, WITH LONG-TERM CURRENT USE OF INSULIN (HCC): ICD-10-CM

## 2019-04-24 DIAGNOSIS — I10 ESSENTIAL HYPERTENSION: ICD-10-CM

## 2019-04-24 DIAGNOSIS — E55.9 VITAMIN D DEFICIENCY, UNSPECIFIED: Primary | ICD-10-CM

## 2019-04-24 DIAGNOSIS — Z79.4 TYPE 2 DIABETES MELLITUS WITH HYPERGLYCEMIA, WITH LONG-TERM CURRENT USE OF INSULIN (HCC): ICD-10-CM

## 2019-04-24 PROCEDURE — 99214 OFFICE O/P EST MOD 30 MIN: CPT | Performed by: NURSE PRACTITIONER

## 2019-04-24 RX ORDER — ROSUVASTATIN CALCIUM 40 MG/1
40 TABLET, COATED ORAL DAILY
Qty: 90 TABLET | Refills: 1 | Status: SHIPPED | OUTPATIENT
Start: 2019-04-24 | End: 2019-05-24 | Stop reason: SDUPTHER

## 2019-04-24 RX ORDER — NATEGLINIDE 120 MG/1
120 TABLET ORAL
Qty: 270 TABLET | Refills: 1 | Status: SHIPPED | OUTPATIENT
Start: 2019-04-24 | End: 2019-08-07 | Stop reason: SINTOL

## 2019-04-24 NOTE — PROGRESS NOTES
Dirk Atkins  presents to the office  for the follow-up appointment for Type 2 diabetes mellitus.     The diabete's condition location is throughout the system with the  clinical course has fluctuated, the severity is Mild, and the modifying/allievating factors are insulin.  Medications and  Dosages were  reviewed with Dirk Atkins and suggested that compliance most of the time.    The patient reports associated symptoms of hyperglycemia have been faint and associated symptoms of hypoglycemia have been none, with their  hypoglycemia threshold for symptoms is n/a mg/dl .     The patient is currently on insulin.    Compliance with blood glucose monitoring: good.     Meal panning: The patient is using avoidance of concentrated sweets.    The patient is currently taking home blood tests - Blood glucose testin-3 times daily, that are:  fasting- 1st thing in morning before eating or drinking  before each meal   basal insulIn  Tresiba U200 76 units in AM     Last instructions given were:  Type 2 diabetes mellitus with hyperglycemia, with long-term current use of insulin- chronic, stable no medication changes at this time. Refills prescribed. Future labs ordered for upcoming appointment and assessment. We will order the [employer] system to assist in SMBG      Mixed hyperlipidemia - chronic,uncontrolled with the LDLs. Medication changes:  Stop the zocor     Start Crestor 40mg nightly.      Vitamin D deficiency, unspecified- chronic, stable no medication changes at this time. Refills prescribed. Future labs ordered for upcoming appointment and assessment.         Essential hypertension- chronic, stable no medication changes at this time. Refills prescribed. Future labs ordered for upcoming appointment and assessment.     Home blood glucose testing daily: 1-3  FB to 130 mg/dl- average 101-116mg/dl  before dinner/supper 100-120 to 200 mg/dl  After meals 183-205mg/dl    Last reported blood glucose readings are:  Home  blood glucose testing daily: 1-3  FB to 265 mg/dl- average last 4-5 days   before lunch 76 to 150 mg/dl average 78-90s  before dinner/supper 78 to 130 mg/dl - average  mg/dl    Patterns reported per patient are none.         The following portions of the patient's history were reviewed and updated as appropriate: current medications, past family history, past medical history, past social history, past surgical history and problem list.      Current Outpatient Medications:   •  aspirin 325 MG tablet, Take 325 mg by mouth daily., Disp: , Rfl:   •  Blood Glucose Calibration (ACCU-CHEK SMARTVIEW CONTROL VI), 2 (two) times a day., Disp: , Rfl:   •  Blood Glucose Monitoring Suppl (ACCU-CHEK CUBA PLUS) w/Device kit, Use kit to test blood glucose 4 times daily DX: e11.65, Disp: 1 kit, Rfl: 0  •  DULoxetine (CYMBALTA) 30 MG capsule, Take one tablet every 12 hours, Disp: 180 capsule, Rfl: 3  •  Empagliflozin-metFORMIN HCl (SYNJARDY) 5-1000 MG tablet, Take 5 mg by mouth 2 (Two) Times a Day., Disp: 180 tablet, Rfl: 1  •  finasteride (PROSCAR) 5 MG tablet, TAKE 1 TABLET EVERY DAY, Disp: 90 tablet, Rfl: 0  •  gabapentin (NEURONTIN) 300 MG capsule, Take 1 capsule by mouth 3 (Three) Times a Day., Disp: 270 capsule, Rfl: 0  •  glucose blood (ACCU-CHEK CUBA PLUS) test strip, Use to test BG 4 times daily. DX Code:e11.65, Disp: 400 each, Rfl: 1  •  Insulin Degludec 200 UNIT/ML solution pen-injector, Inject 54 Units under the skin into the appropriate area as directed Daily. Titrate as instructed with max of 100 units daily, Disp: 9 pen, Rfl: 4  •  Insulin Pen Needle (NOVOFINE) 32G X 6 MM misc, Inject 31 g under the skin into the appropriate area as directed Daily., Disp: 100 each, Rfl: 3  •  isosorbide mononitrate (IMDUR) 30 MG 24 hr tablet, TAKE 1 TABLET EVERY DAY, Disp: 90 tablet, Rfl: 0  •  Lancet Devices (ACCU-CHEK SOFTCLIX) lancets, Test blood glucose 4 times daily dx:e11.65, Disp: 400 each, Rfl: 2  •  Lancets  "(ACCU-CHEK SOFT TOUCH) lancets, test blood glucose 4 times daily DX: e11.65, Disp: 400 each, Rfl: 1  •  lisinopril (PRINIVIL,ZESTRIL) 2.5 MG tablet, 2.5 mg daily., Disp: , Rfl:   •  metoprolol tartrate (LOPRESSOR) 25 MG tablet, TAKE 1 TABLET TWICE DAILY, Disp: 180 tablet, Rfl: 0  •  nitroglycerin (NITROSTAT) 0.4 MG SL tablet, Place 0.4 mg under the tongue., Disp: , Rfl:   •  rosuvastatin (CRESTOR) 40 MG tablet, Take 1 tablet by mouth Daily., Disp: 90 tablet, Rfl: 1  •  tamsulosin (FLOMAX) 0.4 MG capsule 24 hr capsule, TAKE 1 CAPSULE EVERY DAY, Disp: 90 capsule, Rfl: 1  •  vitamin D (ERGOCALCIFEROL) 99152 units capsule capsule, 1 tablet By mouth twice weekly, Disp: 24 capsule, Rfl: 1  •  nateglinide (STARLIX) 120 MG tablet, Take 1 tablet by mouth 3 (Three) Times a Day Before Meals., Disp: 270 tablet, Rfl: 1  •  Semaglutide (OZEMPIC) 1 MG/DOSE solution pen-injector, Inject 1 mg under the skin into the appropriate area as directed 1 (One) Time Per Week., Disp: 4 pen, Rfl: 4    Patient Active Problem List    Diagnosis   • Vitamin D deficiency, unspecified [E55.9]   • Sciatica of left side [M54.32]   • Screen for colon cancer [Z12.11]   • Chronic fatigue [R53.82]   • Hypersomnia [G47.10]   • Myocardial infarct, old [I25.2]   • Chronic tension headache [G44.229]   • B12 deficiency [E53.8]   • Acute conjunctivitis [H10.30]   • Anxiety [F41.9]   • Coronary arteriosclerosis in native artery [I25.10]   • Edema [R60.9]   • Enlarged prostate [N40.0]   • Mixed hyperlipidemia [E78.2]   • Essential hypertension [I10]   • Leukocytosis [D72.829]   • Palpitations [R00.2]   • Type 2 diabetes mellitus with hyperglycemia, with long-term current use of insulin (CMS/HCC) [E11.65, Z79.4]   • Chronic intractable headache [R51]   • Memory impairment [R41.3]   • Obstructive sleep apnea [G47.33]       Review of Systems   A comprehensive review of the 14 systems was negative except of listed below:  Endocrine: hyperglycemia  after eating \"pass " "out\"     Objective:     Wt Readings from Last 3 Encounters:   04/24/19 (!) 159 kg (351 lb)   01/16/19 (!) 156 kg (345 lb)   09/19/18 (!) 152 kg (334 lb)     Temp Readings from Last 3 Encounters:   09/12/18 96.5 °F (35.8 °C)   07/23/18 96.3 °F (35.7 °C)   07/17/18 96.2 °F (35.7 °C)     BP Readings from Last 3 Encounters:   04/24/19 124/78   01/16/19 118/74   09/19/18 132/84     Pulse Readings from Last 3 Encounters:   09/12/18 69   07/23/18 75   07/17/18 69        /78   Ht 198.1 cm (77.99\")   Wt (!) 159 kg (351 lb)   BMI 40.57 kg/m²        Physical Exam   Constitutional: He is oriented to person, place, and time. He appears well-developed and well-nourished. No distress.   HENT:   Head: Normocephalic and atraumatic.   Eyes: EOM are normal. Pupils are equal, round, and reactive to light.   Neck: Normal range of motion. Neck supple. No thyromegaly present.   Cardiovascular: Normal rate, regular rhythm, normal heart sounds and intact distal pulses.   No murmur heard.  Pulmonary/Chest: Effort normal and breath sounds normal.   Abdominal: Soft. Bowel sounds are normal.   Musculoskeletal: Normal range of motion.   Neurological: He is alert and oriented to person, place, and time.   Skin: Skin is warm and dry. Capillary refill takes 2 to 3 seconds. He is not diaphoretic.   Psychiatric: He has a normal mood and affect. His behavior is normal. Judgment and thought content normal.   Nursing note and vitals reviewed.          Lab Review  Results for orders placed or performed in visit on 04/10/19   T4   Result Value Ref Range    T4, Total 6.24 4.50 - 11.70 mcg/dL   T3   Result Value Ref Range    T3, Total 110.0 80.0 - 200.0 ng/dl   TSH   Result Value Ref Range    TSH 1.310 0.270 - 4.200 mIU/mL   Vitamin D 25 Hydroxy   Result Value Ref Range    25 Hydroxy, Vitamin D 73.0 30.0 - 100.0 ng/ml   Uric Acid   Result Value Ref Range    Uric Acid 5.2 3.4 - 7.0 mg/dL   Microalbumin / Creatinine Urine Ratio - Urine, Clean Catch "   Result Value Ref Range    Creatinine, Urine 32.1 Not Estab. mg/dL    Microalbumin, Urine <3.0 Not Estab. ug/mL    Microalbumin/Creatinine Ratio <9.3 0.0 - 30.0 mg/g creat   Hemoglobin A1c   Result Value Ref Range    Hemoglobin A1C 6.00 (H) 4.80 - 5.60 %   Comprehensive Metabolic Panel   Result Value Ref Range    Glucose 88 65 - 99 mg/dL    BUN 12 8 - 23 mg/dL    Creatinine 0.92 0.76 - 1.27 mg/dL    eGFR Non African Am 81 >60 mL/min/1.73    eGFR African Am 98 >60 mL/min/1.73    BUN/Creatinine Ratio 13.0 7.0 - 25.0    Sodium 140 136 - 145 mmol/L    Potassium 4.5 3.5 - 5.2 mmol/L    Chloride 101 98 - 107 mmol/L    Total CO2 26.6 22.0 - 29.0 mmol/L    Calcium 9.9 8.6 - 10.5 mg/dL    Total Protein 7.3 6.0 - 8.5 g/dL    Albumin 4.80 3.50 - 5.20 g/dL    Globulin 2.5 gm/dL    A/G Ratio 1.9 g/dL    Total Bilirubin 0.8 0.2 - 1.2 mg/dL    Alkaline Phosphatase 73 39 - 117 U/L    AST (SGOT) 16 1 - 40 U/L    ALT (SGPT) 13 1 - 41 U/L   Fructosamine   Result Value Ref Range    Fructosamine 233 0 - 285 umol/L   Lipid Panel   Result Value Ref Range    Total Cholesterol 117 0 - 200 mg/dL    Triglycerides 124 0 - 150 mg/dL    HDL Cholesterol 35 (L) 40 - 60 mg/dL    VLDL Cholesterol 24.8 mg/dL    LDL Cholesterol  57 0 - 100 mg/dL   Cardiovascular Risk Assessment   Result Value Ref Range    Interpretation Note    Diabetes Patient Education   Result Value Ref Range    PDF Image Not applicable            Assessment:   Dirk was seen today for follow-up.    Diagnoses and all orders for this visit:    Vitamin D deficiency, unspecified  -     Comprehensive Metabolic Panel; Future  -     Vitamin D 25 Hydroxy; Future    Mixed hyperlipidemia  -     rosuvastatin (CRESTOR) 40 MG tablet; Take 1 tablet by mouth Daily.  -     Comprehensive Metabolic Panel; Future  -     Lipid Panel; Future    Type 2 diabetes mellitus with hyperglycemia, with long-term current use of insulin (CMS/[facility])  -     nateglinide (STARLIX) 120 MG tablet; Take 1 tablet by  mouth 3 (Three) Times a Day Before Meals.  -     Insulin Degludec 200 UNIT/ML solution pen-injector; Inject 54 Units under the skin into the appropriate area as directed Daily. Titrate as instructed with max of 100 units daily  -     Discontinue: Empagliflozin-metFORMIN HCl (SYNJARDY) 5-1000 MG tablet; Take 5 mg by mouth 2 (Two) Times a Day.  -     Semaglutide (OZEMPIC) 1 MG/DOSE solution pen-injector; Inject 1 mg under the skin into the appropriate area as directed 1 (One) Time Per Week.  -     Empagliflozin-metFORMIN HCl (SYNJARDY) 5-1000 MG tablet; Take 5 mg by mouth 2 (Two) Times a Day.  -     Comprehensive Metabolic Panel; Future  -     C-Peptide; Future  -     Hemoglobin A1c; Future  -     Lipid Panel; Future  -     Microalbumin / Creatinine Urine Ratio - Urine, Clean Catch; Future  -     Uric Acid; Future  -     Vitamin D 25 Hydroxy; Future  -     TSH; Future  -     T4, Free; Future  -     Thyroid Antibodies; Future  -     T3, Free; Future    Essential hypertension  -     Comprehensive Metabolic Panel; Future          Plan:   In summary/ Medication changes: I met with this patient is metabolically stable and doing well at this time.  Laboratory testing was reviewed dated 4.10.2019, discussed with questions and answers completed.  Discussed and formulate a treatment plan with patient, patient verbally stated understood all instructions.       Mixed hyperlipidemia- chronic, stable no medication changes at this time. Refills prescribed. Future labs ordered for upcoming appointment and assessment.      Type 2 diabetes mellitus with hyperglycemia, with long-term current use of insulin- chronic, uncontrolled.  Medication changes were as follows    continue Victoza until complete then changed to Ozempic    ozempic start as  0.5 mg injection weekly for 5  weeks maintenance dose will be 1 mg    Continue Synjardy    Start Starlix 120 mg 1 before each meal or at meal or immediately after meal within 10 minutes.  If it  is lighter meals such as a half a sandwich and a salad take half a tablet if you do not eat you do not take this tablet.       Vitamin D def - chronic, stable no medication changes at this time. Refills prescribed. Future labs ordered for upcoming appointment and assessment.         instructions  home blood tests -  Blood glucose testing: 3 times daily, that are:  fasting- 1st thing in morning before eating or drinking  before each meal and 1 or 2 hours after meal  bedtime  anytime you feel symptoms of hyperglycemia or hypoglycemia (high or low blood sugars)        Education:  interpretation of lab results, blood sugar goals, complications of diabetes mellitus, hypoglycemia prevention and treatment, exercise, illness management, self-monitoring of blood glucose skills, nutrition and carbohydrate counting        The total face to face time spent was  25 minutes  with additional education given: 14 minutes (greater than 50% of the total time) was spent with counseling and coordination of care on: SMBG with goals, Side effects profiles with medications, medication use and purposes,    Return in about 3 months (around 7/24/2019), or if symptoms worsen or fail to improve, for Recheck.. .3 months with Heather-2 weeks prior for labs 6 months with Dr. Savage-2 weeks prior for labs    Dragon transcription disclaimer     Much of this encounter note is an electronic transcription/translation of spoken language to printed text. The electronic translation of spoken language may permit erroneous, or at times, nonsensical words or phrases to be inadvertently transcribed. Although I have reviewed the note for such errors, some may still exist.

## 2019-04-24 NOTE — PATIENT INSTRUCTIONS
continue Victoza until complete then changed to Ozempic    ozempic start as  0.5 mg injection weekly for 5  weeks maintenance dose will be 1 mg    Continue Synjardy    Start Starlix 120 mg 1 before each meal or at meal or immediately after meal within 10 minutes.  If it is lighter meals such as a half a sandwich and a salad take half a tablet if you do not eat you do not take this tablet.

## 2019-05-03 ENCOUNTER — OFFICE VISIT (OUTPATIENT)
Dept: INTERNAL MEDICINE | Age: 74
End: 2019-05-03

## 2019-05-03 VITALS
RESPIRATION RATE: 13 BRPM | BODY MASS INDEX: 36.45 KG/M2 | HEART RATE: 64 BPM | WEIGHT: 315 LBS | SYSTOLIC BLOOD PRESSURE: 110 MMHG | DIASTOLIC BLOOD PRESSURE: 70 MMHG | OXYGEN SATURATION: 97 % | HEIGHT: 78 IN | TEMPERATURE: 97.4 F

## 2019-05-03 DIAGNOSIS — Z51.81 ENCOUNTER FOR THERAPEUTIC DRUG MONITORING: ICD-10-CM

## 2019-05-03 DIAGNOSIS — I10 ESSENTIAL HYPERTENSION: Primary | ICD-10-CM

## 2019-05-03 PROCEDURE — 99214 OFFICE O/P EST MOD 30 MIN: CPT | Performed by: INTERNAL MEDICINE

## 2019-05-03 RX ORDER — GABAPENTIN 300 MG/1
300 CAPSULE ORAL 3 TIMES DAILY
Qty: 270 CAPSULE | Refills: 0 | Status: SHIPPED | OUTPATIENT
Start: 2019-05-03 | End: 2020-02-24 | Stop reason: SDUPTHER

## 2019-05-03 NOTE — PROGRESS NOTES
Dirk Atkins is a 73 y.o. male who presents with   Chief Complaint   Patient presents with   • Hypertension   • Encounter encounter for therapeutic drug monitoring     Taking gabapentin 3 times daily   .    73-year-old male.  6-month checkup.  Refill gabapentin.  Urine drug compliance testing.  Continue diabetic/lipid management with [facility].      Hypertension   This is a chronic problem. The current episode started more than 1 year ago. The problem is controlled. Current antihypertension treatment includes beta blockers. The current treatment provides moderate improvement. Compliance problems include diet and exercise.         The following portions of the patient's history were reviewed and updated as appropriate: allergies, current medications, past medical history and problem list.    Review of Systems   Constitutional: Negative.    HENT: Negative.    Eyes: Negative.    Respiratory: Negative.    Cardiovascular: Negative.    Genitourinary: Negative.    Musculoskeletal: Negative.    Skin: Negative.    Neurological: Negative.    Psychiatric/Behavioral: Negative.        Objective   Physical Exam   Constitutional: He is oriented to person, place, and time. He appears well-developed and well-nourished. No distress.   HENT:   Head: Normocephalic and atraumatic.   Eyes: Conjunctivae and EOM are normal. Pupils are equal, round, and reactive to light.   Neck: Normal range of motion. Neck supple. No thyromegaly present.   Neck exam negative.  Carotid auscultation normal-no bruits heard.   Cardiovascular: Normal rate, regular rhythm, normal heart sounds and intact distal pulses. Exam reveals no gallop and no friction rub.   No murmur heard.  Pulmonary/Chest: Effort normal and breath sounds normal. No respiratory distress. He has no wheezes. He has no rales. He exhibits no tenderness.   Neurological: He is alert and oriented to person, place, and time.   Psychiatric: He has a normal mood and affect. His  behavior is normal. Judgment and thought content normal.   Nursing note and vitals reviewed.      Assessment/Plan   Dirk was seen today for hypertension and encounter encounter for therapeutic drug monitoring.    Diagnoses and all orders for this visit:    Essential hypertension    Encounter for therapeutic drug monitoring  -     Compliance Drug Analysis, Ur - Urine, Clean Catch    Other orders  -     gabapentin (NEURONTIN) 300 MG capsule; Take 1 capsule by mouth 3 (Three) Times a Day.        Plan: Labs as above.Today's exam unremarkable for any new problems or events.  Continue all current treatment as prescribed.  A follow-up checkup/lab update visit is advised for 6 months assuming today's labs are all acceptable.

## 2019-05-24 DIAGNOSIS — E78.2 MIXED HYPERLIPIDEMIA: ICD-10-CM

## 2019-05-24 RX ORDER — ROSUVASTATIN CALCIUM 40 MG/1
TABLET, COATED ORAL
Qty: 90 TABLET | Refills: 1 | Status: SHIPPED | OUTPATIENT
Start: 2019-05-24 | End: 2019-08-07 | Stop reason: SDUPTHER

## 2019-05-29 DIAGNOSIS — I10 ESSENTIAL HYPERTENSION: ICD-10-CM

## 2019-05-29 DIAGNOSIS — I25.10 CORONARY ARTERIOSCLEROSIS: ICD-10-CM

## 2019-05-29 RX ORDER — ISOSORBIDE MONONITRATE 30 MG/1
TABLET, EXTENDED RELEASE ORAL
Qty: 90 TABLET | Refills: 0 | Status: SHIPPED | OUTPATIENT
Start: 2019-05-29 | End: 2019-08-03 | Stop reason: SDUPTHER

## 2019-05-29 RX ORDER — FINASTERIDE 5 MG/1
TABLET, FILM COATED ORAL
Qty: 90 TABLET | Refills: 0 | Status: SHIPPED | OUTPATIENT
Start: 2019-05-29 | End: 2019-10-08 | Stop reason: SDUPTHER

## 2019-07-02 RX ORDER — CHOLECALCIFEROL (VITAMIN D3) 1250 MCG
CAPSULE ORAL
Qty: 24 CAPSULE | Refills: 2 | Status: SHIPPED | OUTPATIENT
Start: 2019-07-02 | End: 2020-03-25

## 2019-07-02 RX ORDER — LIRAGLUTIDE 6 MG/ML
INJECTION SUBCUTANEOUS
Qty: 27 ML | Refills: 0 | Status: SHIPPED | OUTPATIENT
Start: 2019-07-02 | End: 2019-08-07 | Stop reason: SDUPTHER

## 2019-07-22 DIAGNOSIS — E11.65 TYPE 2 DIABETES MELLITUS WITH HYPERGLYCEMIA, WITH LONG-TERM CURRENT USE OF INSULIN (HCC): ICD-10-CM

## 2019-07-22 DIAGNOSIS — Z79.4 TYPE 2 DIABETES MELLITUS WITH HYPERGLYCEMIA, WITH LONG-TERM CURRENT USE OF INSULIN (HCC): ICD-10-CM

## 2019-07-24 ENCOUNTER — RESULTS ENCOUNTER (OUTPATIENT)
Dept: ENDOCRINOLOGY | Age: 74
End: 2019-07-24

## 2019-07-24 DIAGNOSIS — E55.9 VITAMIN D DEFICIENCY, UNSPECIFIED: ICD-10-CM

## 2019-07-24 DIAGNOSIS — Z79.4 TYPE 2 DIABETES MELLITUS WITH HYPERGLYCEMIA, WITH LONG-TERM CURRENT USE OF INSULIN (HCC): ICD-10-CM

## 2019-07-24 DIAGNOSIS — E78.2 MIXED HYPERLIPIDEMIA: ICD-10-CM

## 2019-07-24 DIAGNOSIS — I10 ESSENTIAL HYPERTENSION: ICD-10-CM

## 2019-07-24 DIAGNOSIS — E11.65 TYPE 2 DIABETES MELLITUS WITH HYPERGLYCEMIA, WITH LONG-TERM CURRENT USE OF INSULIN (HCC): ICD-10-CM

## 2019-07-25 LAB
25(OH)D3+25(OH)D2 SERPL-MCNC: 73.6 NG/ML (ref 30–100)
ALBUMIN SERPL-MCNC: 4.7 G/DL (ref 3.5–5.2)
ALBUMIN/CREAT UR: 13.2 MG/G CREAT (ref 0–30)
ALBUMIN/GLOB SERPL: 2.4 G/DL
ALP SERPL-CCNC: 63 U/L (ref 39–117)
ALT SERPL-CCNC: 11 U/L (ref 1–41)
AST SERPL-CCNC: 15 U/L (ref 1–40)
BILIRUB SERPL-MCNC: 0.6 MG/DL (ref 0.2–1.2)
BUN SERPL-MCNC: 10 MG/DL (ref 8–23)
BUN/CREAT SERPL: 11.2 (ref 7–25)
C PEPTIDE SERPL-MCNC: 1.6 NG/ML (ref 1.1–4.4)
CALCIUM SERPL-MCNC: 9.4 MG/DL (ref 8.6–10.5)
CHLORIDE SERPL-SCNC: 101 MMOL/L (ref 98–107)
CHOLEST SERPL-MCNC: 100 MG/DL (ref 0–200)
CO2 SERPL-SCNC: 26.1 MMOL/L (ref 22–29)
CREAT SERPL-MCNC: 0.89 MG/DL (ref 0.76–1.27)
CREAT UR-MCNC: 73.3 MG/DL
GLOBULIN SER CALC-MCNC: 2 GM/DL
GLUCOSE SERPL-MCNC: 108 MG/DL (ref 65–99)
HBA1C MFR BLD: 6.6 % (ref 4.8–5.6)
HDLC SERPL-MCNC: 32 MG/DL (ref 40–60)
INTERPRETATION: NORMAL
LDLC SERPL CALC-MCNC: 42 MG/DL (ref 0–100)
Lab: NORMAL
MICROALBUMIN UR-MCNC: 9.7 UG/ML
POTASSIUM SERPL-SCNC: 5.5 MMOL/L (ref 3.5–5.2)
PROT SERPL-MCNC: 6.7 G/DL (ref 6–8.5)
SODIUM SERPL-SCNC: 141 MMOL/L (ref 136–145)
T3FREE SERPL-MCNC: 2.9 PG/ML (ref 2–4.4)
T4 FREE SERPL-MCNC: 0.9 NG/DL (ref 0.93–1.7)
THYROGLOB AB SERPL-ACNC: <1 IU/ML (ref 0–0.9)
THYROPEROXIDASE AB SERPL-ACNC: 9 IU/ML (ref 0–34)
TRIGL SERPL-MCNC: 129 MG/DL (ref 0–150)
TSH SERPL DL<=0.005 MIU/L-ACNC: 1.13 MIU/ML (ref 0.27–4.2)
URATE SERPL-MCNC: 5 MG/DL (ref 3.4–7)
VLDLC SERPL CALC-MCNC: 25.8 MG/DL

## 2019-08-03 DIAGNOSIS — I25.10 CORONARY ARTERIOSCLEROSIS: ICD-10-CM

## 2019-08-03 DIAGNOSIS — M25.572 ACUTE BILATERAL ANKLE PAIN: ICD-10-CM

## 2019-08-03 DIAGNOSIS — M25.571 ACUTE BILATERAL ANKLE PAIN: ICD-10-CM

## 2019-08-03 DIAGNOSIS — I10 ESSENTIAL HYPERTENSION: ICD-10-CM

## 2019-08-05 RX ORDER — ISOSORBIDE MONONITRATE 30 MG/1
TABLET, EXTENDED RELEASE ORAL
Qty: 90 TABLET | Refills: 0 | Status: SHIPPED | OUTPATIENT
Start: 2019-08-05 | End: 2019-10-10 | Stop reason: SDUPTHER

## 2019-08-05 RX ORDER — DULOXETIN HYDROCHLORIDE 30 MG/1
CAPSULE, DELAYED RELEASE ORAL
Qty: 180 CAPSULE | Refills: 3 | Status: SHIPPED | OUTPATIENT
Start: 2019-08-05 | End: 2020-05-28

## 2019-08-07 ENCOUNTER — OFFICE VISIT (OUTPATIENT)
Dept: ENDOCRINOLOGY | Age: 74
End: 2019-08-07

## 2019-08-07 VITALS — HEIGHT: 78 IN | WEIGHT: 315 LBS | BODY MASS INDEX: 36.45 KG/M2

## 2019-08-07 DIAGNOSIS — E78.2 MIXED HYPERLIPIDEMIA: ICD-10-CM

## 2019-08-07 DIAGNOSIS — E11.65 TYPE 2 DIABETES MELLITUS WITH HYPERGLYCEMIA, WITH LONG-TERM CURRENT USE OF INSULIN (HCC): Primary | ICD-10-CM

## 2019-08-07 DIAGNOSIS — E67.3 HYPERVITAMINOSIS D: ICD-10-CM

## 2019-08-07 DIAGNOSIS — Z79.4 TYPE 2 DIABETES MELLITUS WITH HYPERGLYCEMIA, WITH LONG-TERM CURRENT USE OF INSULIN (HCC): Primary | ICD-10-CM

## 2019-08-07 PROCEDURE — 99214 OFFICE O/P EST MOD 30 MIN: CPT | Performed by: NURSE PRACTITIONER

## 2019-08-07 RX ORDER — ROSUVASTATIN CALCIUM 40 MG/1
40 TABLET, COATED ORAL DAILY
Qty: 90 TABLET | Refills: 1 | Status: SHIPPED | OUTPATIENT
Start: 2019-08-07 | End: 2019-12-11 | Stop reason: SDUPTHER

## 2019-08-07 NOTE — PATIENT INSTRUCTIONS
continue Victoza       Stop Synjardy 5/1000 twice daily    Start synjardy 12.5/1000mg twice daily    Start Starlix 120 mg 1 before each meal or at meal or immediately after meal within 10 minutes.  If it is lighter meals such as a half a sandwich and a salad take half a tablet if you do not eat you do not take this tablet.    tresiba 74u - incresae 2units every 3 days if fasting is over 110mg/dl

## 2019-10-01 DIAGNOSIS — N40.0 ENLARGED PROSTATE WITHOUT LOWER URINARY TRACT SYMPTOMS (LUTS): ICD-10-CM

## 2019-10-01 RX ORDER — TAMSULOSIN HYDROCHLORIDE 0.4 MG/1
CAPSULE ORAL
Qty: 90 CAPSULE | Refills: 1 | Status: SHIPPED | OUTPATIENT
Start: 2019-10-01 | End: 2020-03-30

## 2019-10-08 RX ORDER — FINASTERIDE 5 MG/1
TABLET, FILM COATED ORAL
Qty: 90 TABLET | Refills: 0 | Status: SHIPPED | OUTPATIENT
Start: 2019-10-08 | End: 2019-10-27 | Stop reason: SDUPTHER

## 2019-10-09 DIAGNOSIS — Z79.4 TYPE 2 DIABETES MELLITUS WITH HYPERGLYCEMIA, WITH LONG-TERM CURRENT USE OF INSULIN (HCC): ICD-10-CM

## 2019-10-09 DIAGNOSIS — E11.65 TYPE 2 DIABETES MELLITUS WITH HYPERGLYCEMIA, WITH LONG-TERM CURRENT USE OF INSULIN (HCC): ICD-10-CM

## 2019-10-10 DIAGNOSIS — I25.10 CORONARY ARTERIOSCLEROSIS: ICD-10-CM

## 2019-10-10 RX ORDER — ISOSORBIDE MONONITRATE 30 MG/1
TABLET, EXTENDED RELEASE ORAL
Qty: 90 TABLET | Refills: 0 | Status: SHIPPED | OUTPATIENT
Start: 2019-10-10 | End: 2019-10-27 | Stop reason: SDUPTHER

## 2019-10-15 ENCOUNTER — PATIENT MESSAGE (OUTPATIENT)
Dept: INTERNAL MEDICINE | Age: 74
End: 2019-10-15

## 2019-10-15 ENCOUNTER — TELEPHONE (OUTPATIENT)
Dept: INTERNAL MEDICINE | Age: 74
End: 2019-10-15

## 2019-10-15 NOTE — TELEPHONE ENCOUNTER
Regarding: Non-Urgent Medical Question  Contact: [phone]  ----- Message from [employer], [employer] sent at 10/15/2019 11:24 AM EDT -----    Angie can you do me a Gael favor and call medical records and see when they are going to mail me my medical records it has been three weeks now still no records I call them but all I get is a voice mail I leave a message but I never get to talk to anyone if you can get a answer to my question just leave me text as you have always done thanks baby you are the greatest

## 2019-10-21 PROBLEM — N40.0 BPH (BENIGN PROSTATIC HYPERTROPHY): Status: ACTIVE | Noted: 2019-10-21

## 2019-10-21 PROBLEM — R07.9 CHEST PAIN AT REST: Status: ACTIVE | Noted: 2019-05-06

## 2019-10-27 DIAGNOSIS — I25.10 CORONARY ARTERIOSCLEROSIS: ICD-10-CM

## 2019-10-27 DIAGNOSIS — I10 ESSENTIAL HYPERTENSION: ICD-10-CM

## 2019-10-28 ENCOUNTER — TELEPHONE (OUTPATIENT)
Dept: CASE MANAGEMENT | Facility: OTHER | Age: 74
End: 2019-10-28

## 2019-10-28 RX ORDER — FINASTERIDE 5 MG/1
TABLET, FILM COATED ORAL
Qty: 90 TABLET | Refills: 0 | Status: SHIPPED | OUTPATIENT
Start: 2019-10-28 | End: 2020-03-16

## 2019-10-28 RX ORDER — ISOSORBIDE MONONITRATE 30 MG/1
TABLET, EXTENDED RELEASE ORAL
Qty: 90 TABLET | Refills: 0 | Status: SHIPPED | OUTPATIENT
Start: 2019-10-28 | End: 2020-06-22 | Stop reason: SDUPTHER

## 2019-10-28 NOTE — TELEPHONE ENCOUNTER
Left message for patient to return Care Advisor's call to schedule AWV. Phone number provided [phone].

## 2019-11-07 ENCOUNTER — OFFICE VISIT (OUTPATIENT)
Dept: INTERNAL MEDICINE | Age: 74
End: 2019-11-07

## 2019-11-07 ENCOUNTER — RESULTS ENCOUNTER (OUTPATIENT)
Dept: ENDOCRINOLOGY | Age: 74
End: 2019-11-07

## 2019-11-07 VITALS
BODY MASS INDEX: 36.45 KG/M2 | HEIGHT: 78 IN | TEMPERATURE: 96.9 F | DIASTOLIC BLOOD PRESSURE: 76 MMHG | RESPIRATION RATE: 13 BRPM | SYSTOLIC BLOOD PRESSURE: 124 MMHG | OXYGEN SATURATION: 98 % | WEIGHT: 315 LBS | HEART RATE: 63 BPM

## 2019-11-07 DIAGNOSIS — R07.89 CHEST PRESSURE: ICD-10-CM

## 2019-11-07 DIAGNOSIS — E11.65 TYPE 2 DIABETES MELLITUS WITH HYPERGLYCEMIA, WITH LONG-TERM CURRENT USE OF INSULIN (HCC): ICD-10-CM

## 2019-11-07 DIAGNOSIS — R06.09 DYSPNEA ON EXERTION: ICD-10-CM

## 2019-11-07 DIAGNOSIS — E78.2 MIXED HYPERLIPIDEMIA: ICD-10-CM

## 2019-11-07 DIAGNOSIS — Z79.4 TYPE 2 DIABETES MELLITUS WITH HYPERGLYCEMIA, WITH LONG-TERM CURRENT USE OF INSULIN (HCC): ICD-10-CM

## 2019-11-07 DIAGNOSIS — I10 ESSENTIAL HYPERTENSION: Primary | ICD-10-CM

## 2019-11-07 DIAGNOSIS — E67.3 HYPERVITAMINOSIS D: ICD-10-CM

## 2019-11-07 PROCEDURE — 99214 OFFICE O/P EST MOD 30 MIN: CPT | Performed by: INTERNAL MEDICINE

## 2019-11-07 NOTE — PROGRESS NOTES
"  Dirk Atkins is a 74 y.o. male who presents with   Chief Complaint   Patient presents with   • Hypertension     Lisinopril 2.5 mg; metoprolol tartrate 25 mg   • Diabetes/hyperlipidemia     Endocrinology care, treatment, management and lab testing per [facility]   • Chest pressure/shortness of breath-with exertion     Past several weeks.  Getting worse.  Prior history of MI/stent.  Cardiologist-Dr. Blackburn of the [facility]   .    74-year-old male presents for his 6-month general checkup.  His diabetic care and lipid management are all being rendered via endocrinology and lab testing is being done through that office.  On his visit today he states that for the last several weeks he has been having shortness of breath with exertion and chest pressure with exertion, both getting worse over the passage of time.  When the symptoms occur he denies any nausea, vomiting or diaphoresis but says if he quits what he is doing and rests, the symptoms subside \"after a few minutes\".  Resumption of physical activity causes return of symptoms he says.  His cardiologist is Dr. Blackburn of the [facility] and the patient had a stress test in January 2019 that at that point in time was negative other than for evidence of a prior MI.      Hypertension   This is a chronic problem. The current episode started more than 1 year ago. The problem is unchanged. The problem is controlled. Associated symptoms include shortness of breath. Pertinent negatives include no chest pain, headaches, neck pain, orthopnea or PND. Current antihypertension treatment includes beta blockers and ACE inhibitors. The current treatment provides moderate improvement. Compliance problems include diet and exercise.    Shortness of Breath   This is a recurrent problem. The current episode started more than 1 month ago. The problem occurs daily. The problem has been gradually worsening. Associated symptoms include rhinorrhea and sputum " "production. Pertinent negatives include no abdominal pain, chest pain, claudication, coryza, ear pain, fever, headaches, hemoptysis, leg pain, leg swelling, neck pain, orthopnea, PND, rash, sore throat, swollen glands, syncope, vomiting or wheezing. The symptoms are aggravated by occupational exposure and exercise. He has tried nothing for the symptoms. His past medical history is significant for CAD.        /76   Pulse 63   Temp 96.9 °F (36.1 °C) (Temporal)   Resp 13   Ht 198.1 cm (77.99\")   Wt (!) 158 kg (348 lb 9.6 oz)   SpO2 98%   BMI 40.29 kg/m²     The following portions of the patient's history were reviewed and updated as appropriate: allergies, current medications, past medical history and problem list.    Review of Systems   Constitutional: Negative.  Negative for fever.   HENT: Positive for rhinorrhea. Negative for ear pain and sore throat.    Eyes: Negative.    Respiratory: Positive for sputum production and shortness of breath. Negative for hemoptysis and wheezing.    Cardiovascular: Negative.  Negative for chest pain, orthopnea, claudication, leg swelling, syncope and PND.   Gastrointestinal: Negative for abdominal pain and vomiting.   Genitourinary: Negative.    Musculoskeletal: Negative.  Negative for neck pain.   Skin: Negative.  Negative for rash.   Neurological: Negative.  Negative for headaches.   Psychiatric/Behavioral: Negative.        Objective   Physical Exam   Constitutional: He is oriented to person, place, and time. He appears well-developed and well-nourished. No distress.   HENT:   Head: Normocephalic and atraumatic.   Eyes: Conjunctivae and EOM are normal. Pupils are equal, round, and reactive to light.   Neck: Normal range of motion. Neck supple. No thyromegaly present.   Neck exam negative.  Carotid auscultation normal-no bruits heard.   Cardiovascular: Normal rate, regular rhythm, normal heart sounds and intact distal pulses. Exam reveals no gallop and no friction rub. "   No murmur heard.  Pulmonary/Chest: Effort normal and breath sounds normal. No respiratory distress. He has no wheezes. He has no rales. He exhibits no tenderness.   Musculoskeletal: He exhibits no edema.   Neurological: He is alert and oriented to person, place, and time.   Psychiatric: He has a normal mood and affect. His behavior is normal. Judgment and thought content normal.   Nursing note and vitals reviewed.      Assessment/Plan   Dirk was seen today for hypertension, diabetes/hyperlipidemia and chest pressure/shortness of breath-with exertion.    Diagnoses and all orders for this visit:    Essential hypertension    Dyspnea on exertion  -     Ambulatory Referral to Cardiology    Chest pressure  -     Ambulatory Referral to Cardiology    Type 2 diabetes mellitus with hyperglycemia, with long-term current use of insulin (CMS/[facility])        Plan: I have suggested to the patient that his symptoms are cardiac until proven otherwise.  We will enter a cardiology referral back to Dr. Blackburn for an urgent evaluation.  Patient advised that if problems worsen before Dr. Blackburn can see him then go to the emergency room for further evaluation.  He says whenever he goes to an ER he generally goes to [facility].    Diabetes/hyperlipidemia: Continue endocrinology care, treatment, management and lab testing.    Follow-up blood pressure checkup-6 months.

## 2019-11-27 LAB
25(OH)D3+25(OH)D2 SERPL-MCNC: 94.8 NG/ML (ref 30–100)
ALBUMIN SERPL-MCNC: 5.1 G/DL (ref 3.5–5.2)
ALBUMIN/CREAT UR: 4.1 MG/G CREAT (ref 0–30)
ALBUMIN/GLOB SERPL: 2.2 G/DL
ALP SERPL-CCNC: 62 U/L (ref 39–117)
ALT SERPL-CCNC: 14 U/L (ref 1–41)
AST SERPL-CCNC: 16 U/L (ref 1–40)
BILIRUB SERPL-MCNC: 0.6 MG/DL (ref 0.2–1.2)
BUN SERPL-MCNC: 16 MG/DL (ref 8–23)
BUN/CREAT SERPL: 16.3 (ref 7–25)
CALCIUM SERPL-MCNC: 9.6 MG/DL (ref 8.6–10.5)
CHLORIDE SERPL-SCNC: 101 MMOL/L (ref 98–107)
CHOLEST SERPL-MCNC: 111 MG/DL (ref 0–200)
CO2 SERPL-SCNC: 28 MMOL/L (ref 22–29)
CREAT SERPL-MCNC: 0.98 MG/DL (ref 0.76–1.27)
CREAT UR-MCNC: 93.6 MG/DL
FRUCTOSAMINE SERPL-SCNC: 247 UMOL/L (ref 0–285)
GLOBULIN SER CALC-MCNC: 2.3 GM/DL
GLUCOSE SERPL-MCNC: 91 MG/DL (ref 65–99)
HBA1C MFR BLD: 6.3 % (ref 4.8–5.6)
HDLC SERPL-MCNC: 38 MG/DL (ref 40–60)
INTERPRETATION: NORMAL
LDLC SERPL CALC-MCNC: 52 MG/DL (ref 0–100)
Lab: NORMAL
MICROALBUMIN UR-MCNC: 3.8 UG/ML
POTASSIUM SERPL-SCNC: 4.7 MMOL/L (ref 3.5–5.2)
PROT SERPL-MCNC: 7.4 G/DL (ref 6–8.5)
SODIUM SERPL-SCNC: 141 MMOL/L (ref 136–145)
T3FREE SERPL-MCNC: 3.2 PG/ML (ref 2–4.4)
T4 FREE SERPL-MCNC: 0.97 NG/DL (ref 0.93–1.7)
THYROGLOB AB SERPL-ACNC: <1 IU/ML (ref 0–0.9)
THYROPEROXIDASE AB SERPL-ACNC: 7 IU/ML (ref 0–34)
TRIGL SERPL-MCNC: 107 MG/DL (ref 0–150)
TSH SERPL DL<=0.005 MIU/L-ACNC: 1.74 UIU/ML (ref 0.27–4.2)
URATE SERPL-MCNC: 5.6 MG/DL (ref 3.4–7)
VLDLC SERPL CALC-MCNC: 21.4 MG/DL

## 2019-12-11 ENCOUNTER — OFFICE VISIT (OUTPATIENT)
Dept: ENDOCRINOLOGY | Age: 74
End: 2019-12-11

## 2019-12-11 VITALS
BODY MASS INDEX: 36.45 KG/M2 | HEIGHT: 78 IN | WEIGHT: 315 LBS | DIASTOLIC BLOOD PRESSURE: 64 MMHG | SYSTOLIC BLOOD PRESSURE: 118 MMHG

## 2019-12-11 DIAGNOSIS — E11.65 TYPE 2 DIABETES MELLITUS WITH HYPERGLYCEMIA, WITH LONG-TERM CURRENT USE OF INSULIN (HCC): ICD-10-CM

## 2019-12-11 DIAGNOSIS — Z79.4 TYPE 2 DIABETES MELLITUS WITH HYPERGLYCEMIA, WITH LONG-TERM CURRENT USE OF INSULIN (HCC): ICD-10-CM

## 2019-12-11 DIAGNOSIS — E55.9 VITAMIN D DEFICIENCY, UNSPECIFIED: Primary | ICD-10-CM

## 2019-12-11 DIAGNOSIS — I10 ESSENTIAL HYPERTENSION: ICD-10-CM

## 2019-12-11 DIAGNOSIS — M62.81 MUSCLE WEAKNESS: ICD-10-CM

## 2019-12-11 DIAGNOSIS — E78.2 MIXED HYPERLIPIDEMIA: ICD-10-CM

## 2019-12-11 DIAGNOSIS — R26.9 GAIT DIFFICULTY: ICD-10-CM

## 2019-12-11 PROCEDURE — 99214 OFFICE O/P EST MOD 30 MIN: CPT | Performed by: NURSE PRACTITIONER

## 2019-12-11 RX ORDER — ROSUVASTATIN CALCIUM 40 MG/1
40 TABLET, COATED ORAL DAILY
Qty: 90 TABLET | Refills: 1 | Status: SHIPPED | OUTPATIENT
Start: 2019-12-11 | End: 2020-03-20 | Stop reason: SDUPTHER

## 2019-12-11 NOTE — PROGRESS NOTES
Dirk Atkins  presents to the office  for the follow-up appointment for Type 2 diabetes mellitus.     The diabete's condition location is throughout the system with the  clinical course has fluctuated, the severity is Mild, and the modifying/allievating factors are insulin.  Medications and  Dosages were  reviewed with Dirk Atkins and suggested that compliance most of the time.    The patient reports associated symptoms of hyperglycemia have been faint and associated symptoms of hypoglycemia have been none, with their  hypoglycemia threshold for symptoms is n/a mg/dl .     The patient is currently on insulin.       Compliance with blood glucose monitoring: good.     Meal panning: The patient is using avoidance of concentrated sweets.    The patient is currently taking home blood tests - Blood glucose testin times daily, that are:  fasting- 1st thing in morning before eating or drinking  before each meal  bedtime   basal insulIn  Tresiba U200 92 units in AM     Last instructions given were:  continue Victoza    Stop Synjardy 1000 twice daily  Start synjardy 12.5/1000mg twice daily  Start Starlix 120 mg 1 before each meal or at meal or immediately after meal within 10 minutes.  If it is lighter meals such as a half a sandwich and a salad take half a tablet if you do not eat you do not take this tablet.  tresiba 74u - incresae 2units every 3 days if fasting is over 110mg/dl       Home blood glucose testing daily: 4  FB to 120 mg/dl  after lunch 140 to 220 mg/dl  before dinner/supper 118 to 212 mg/dl  bedtime 105 to 273 mg/dl   Pt brought log     Last reported blood glucose readings are:  Home blood glucose testing daily: 1-2  FB to 250 mg/dl   after dinner/supper 160 to 170 mg/dl      Patterns reported per patient are none.         The following portions of the patient's history were reviewed and updated as appropriate: current medications, past family history, past medical history, past social  history, past surgical history and problem list.      Current Outpatient Medications:   •  aspirin 325 MG tablet, Take 325 mg by mouth daily., Disp: , Rfl:   •  Blood Glucose Calibration (ACCU-CHEK SMARTVIEW CONTROL VI), 2 (two) times a day., Disp: , Rfl:   •  Blood Glucose Monitoring Suppl (ACCU-CHEK CUBA PLUS) w/Device kit, Use kit to test blood glucose 4 times daily DX: e11.65, Disp: 1 kit, Rfl: 0  •  Cholecalciferol (VITAMIN D3) 62501 units capsule, TAKE 1 CAPSULE TWICE WEEKLY, Disp: 24 capsule, Rfl: 2  •  DULoxetine (CYMBALTA) 30 MG capsule, TAKE 1 CAPSULE EVERY 12 HOURS, Disp: 180 capsule, Rfl: 3  •  Empagliflozin-metFORMIN HCl 12.5-1000 MG tablet, Take 12.5 mg by mouth 2 (Two) Times a Day., Disp: 60 tablet, Rfl: 4  •  finasteride (PROSCAR) 5 MG tablet, TAKE 1 TABLET EVERY DAY, Disp: 90 tablet, Rfl: 0  •  gabapentin (NEURONTIN) 300 MG capsule, Take 1 capsule by mouth 3 (Three) Times a Day., Disp: 270 capsule, Rfl: 0  •  glucose blood (ACCU-CHEK CUBA PLUS) test strip, TEST BLOOD GLUCOSE FOUR TIMES DAILY, Disp: 400 each, Rfl: 1  •  Insulin Degludec (TRESIBA FLEXTOUCH) 200 UNIT/ML solution pen-injector pen injection, Inject 92 Units under the skin into the appropriate area as directed Daily. Titrate as instructed with max of 100 units daily, Disp: 27 pen, Rfl: 1  •  Insulin Pen Needle (NOVOFINE) 32G X 6 MM misc, 2 inj daily, Disp: 300 each, Rfl: 3  •  isosorbide mononitrate (IMDUR) 30 MG 24 hr tablet, TAKE 1 TABLET EVERY DAY, Disp: 90 tablet, Rfl: 0  •  Lancet Devices (ACCU-CHEK SOFTCLIX) lancets, Test blood glucose 4 times daily dx:e11.65, Disp: 400 each, Rfl: 2  •  lisinopril (PRINIVIL,ZESTRIL) 2.5 MG tablet, 2.5 mg daily., Disp: , Rfl:   •  metoprolol tartrate (LOPRESSOR) 25 MG tablet, TAKE 1 TABLET TWICE DAILY, Disp: 180 tablet, Rfl: 0  •  nitroglycerin (NITROSTAT) 0.4 MG SL tablet, Place 0.4 mg under the tongue., Disp: , Rfl:   •  rosuvastatin (CRESTOR) 40 MG tablet, Take 1 tablet by mouth Daily., Disp: 90  "tablet, Rfl: 1  •  tamsulosin (FLOMAX) 0.4 MG capsule 24 hr capsule, TAKE 1 CAPSULE EVERY DAY, Disp: 90 capsule, Rfl: 1  •  Semaglutide, 1 MG/DOSE, (OZEMPIC, 1 MG/DOSE,) 2 MG/1.5ML solution pen-injector, Inject 1 mg under the skin into the appropriate area as directed 1 (One) Time Per Week., Disp: 12 pen, Rfl: 1    Patient Active Problem List    Diagnosis   • Muscle weakness [M62.81]   • Gait difficulty [R26.9]   • BPH (benign prostatic hypertrophy) [N40.0]   • Chest pain at rest [R07.9]   • Vitamin D deficiency, unspecified [E55.9]   • Sciatica of left side [M54.32]   • Screen for colon cancer [Z12.11]   • Chronic fatigue [R53.82]   • Hypersomnia [G47.10]   • Myocardial infarct, old [I25.2]   • Chronic tension headache [G44.229]   • B12 deficiency [E53.8]   • Acute conjunctivitis [H10.30]   • Anxiety [F41.9]   • Coronary arteriosclerosis in native artery [I25.10]   • Edema [R60.9]   • Enlarged prostate [N40.0]   • Mixed hyperlipidemia [E78.2]   • Essential hypertension [I10]   • Leukocytosis [D72.829]   • Palpitations [R00.2]   • Type 2 diabetes mellitus with hyperglycemia, with long-term current use of insulin (CMS/[facility]) [E11.65, Z79.4]   • Chronic intractable headache [R51]   • Memory impairment [R41.3]   • Obstructive sleep apnea [G47.33]       Review of Systems   A comprehensive review of the 14 systems was negative except of listed below:  Constitutional: fatigue and weight gain 6* lb  in 1 month**  Respiratory: positive for wheezing, dyspnea at rest and dyspnea on exertion  Cardiovascular: positive for chest pressure/discomfort, dyspnea, fatigue and weight gain  Musculoskeletal:positive for muscle weakness and legs isaac are \"buckling\" with numbness in feet- patient reports recent fall with numbness in thigh - with burning, radiating to anterior surface of the left thigh  Neurological: positive for coordination problems, gait problems and weakness  Endocrine: hyperglycemia        Objective:     Wt Readings from " "Last 3 Encounters:   12/11/19 (!) 161 kg (354 lb)   11/07/19 (!) 158 kg (348 lb 9.6 oz)   08/07/19 (!) 160 kg (353 lb)     Temp Readings from Last 3 Encounters:   11/07/19 96.9 °F (36.1 °C) (Temporal)   05/03/19 97.4 °F (36.3 °C)   09/12/18 96.5 °F (35.8 °C)     BP Readings from Last 3 Encounters:   12/11/19 118/64   11/07/19 124/76   05/03/19 110/70     Pulse Readings from Last 3 Encounters:   11/07/19 63   05/03/19 64   09/12/18 69        /64   Ht 198.1 cm (77.99\")   Wt (!) 161 kg (354 lb)   BMI 40.92 kg/m²        Physical Exam   Constitutional: He is oriented to person, place, and time. He appears well-developed and well-nourished. No distress.   HENT:   Head: Normocephalic and atraumatic.   Eyes: Pupils are equal, round, and reactive to light. EOM are normal.   Neck: Normal range of motion. Neck supple. No thyromegaly present.   Cardiovascular: Normal rate, regular rhythm, normal heart sounds and intact distal pulses.   No murmur heard.  Pulmonary/Chest: Effort normal and breath sounds normal.   Abdominal: Soft. Bowel sounds are normal.   Musculoskeletal: Normal range of motion.   Neurological: He is alert and oriented to person, place, and time.   Skin: Skin is warm and dry. Capillary refill takes 2 to 3 seconds. He is not diaphoretic.   Psychiatric: He has a normal mood and affect. His behavior is normal. Judgment and thought content normal.   Nursing note and vitals reviewed.          Lab Review  Results for orders placed or performed in visit on 11/07/19   Fructosamine   Result Value Ref Range    Fructosamine 247 0 - 285 umol/L   Comprehensive Metabolic Panel   Result Value Ref Range    Glucose 91 65 - 99 mg/dL    BUN 16 8 - 23 mg/dL    Creatinine 0.98 0.76 - 1.27 mg/dL    eGFR Non African Am 75 >60 mL/min/1.73    eGFR African Am 91 >60 mL/min/1.73    BUN/Creatinine Ratio 16.3 7.0 - 25.0    Sodium 141 136 - 145 mmol/L    Potassium 4.7 3.5 - 5.2 mmol/L    Chloride 101 98 - 107 mmol/L    Total CO2 " 28.0 22.0 - 29.0 mmol/L    Calcium 9.6 8.6 - 10.5 mg/dL    Total Protein 7.4 6.0 - 8.5 g/dL    Albumin 5.10 3.50 - 5.20 g/dL    Globulin 2.3 gm/dL    A/G Ratio 2.2 g/dL    Total Bilirubin 0.6 0.2 - 1.2 mg/dL    Alkaline Phosphatase 62 39 - 117 U/L    AST (SGOT) 16 1 - 40 U/L    ALT (SGPT) 14 1 - 41 U/L   Lipid Panel   Result Value Ref Range    Total Cholesterol 111 0 - 200 mg/dL    Triglycerides 107 0 - 150 mg/dL    HDL Cholesterol 38 (L) 40 - 60 mg/dL    VLDL Cholesterol 21.4 mg/dL    LDL Cholesterol  52 0 - 100 mg/dL   Hemoglobin A1c   Result Value Ref Range    Hemoglobin A1C 6.30 (H) 4.80 - 5.60 %   Microalbumin / Creatinine Urine Ratio - Urine, Clean Catch   Result Value Ref Range    Creatinine, Urine 93.6 Not Estab. mg/dL    Microalbumin, Urine 3.8 Not Estab. ug/mL    Microalbumin/Creatinine Ratio 4.1 0.0 - 30.0 mg/g creat   Uric Acid   Result Value Ref Range    Uric Acid 5.6 3.4 - 7.0 mg/dL   Vitamin D 25 Hydroxy   Result Value Ref Range    25 Hydroxy, Vitamin D 94.8 30.0 - 100.0 ng/ml   TSH   Result Value Ref Range    TSH 1.740 0.270 - 4.200 uIU/mL   Thyroid Antibodies   Result Value Ref Range    Thyroid Peroxidase Antibody 7 0 - 34 IU/mL    Thyroglobulin Ab <1.0 0.0 - 0.9 IU/mL   T4, Free   Result Value Ref Range    Free T4 0.97 0.93 - 1.70 ng/dL   T3, Free   Result Value Ref Range    T3, Free 3.2 2.0 - 4.4 pg/mL   Cardiovascular Risk Assessment   Result Value Ref Range    Interpretation Note    Diabetes Patient Education   Result Value Ref Range    PDF Image Not applicable            Assessment:   Dirk was seen today for follow-up.    Diagnoses and all orders for this visit:    Vitamin D deficiency, unspecified  -     Comprehensive Metabolic Panel; Future  -     Vitamin D 25 Hydroxy; Future    Mixed hyperlipidemia  -     rosuvastatin (CRESTOR) 40 MG tablet; Take 1 tablet by mouth Daily.  -     Comprehensive Metabolic Panel; Future    Type 2 diabetes mellitus with hyperglycemia, with long-term current use  of insulin (CMS/[facility])  -     Empagliflozin-metFORMIN HCl 12.5-1000 MG tablet; Take 12.5 mg by mouth 2 (Two) Times a Day.  -     Insulin Degludec (TRESIBA FLEXTOUCH) 200 UNIT/ML solution pen-injector pen injection; Inject 92 Units under the skin into the appropriate area as directed Daily. Titrate as instructed with max of 100 units daily  -     Insulin Pen Needle (NOVOFINE) 32G X 6 MM misc; 2 inj daily  -     Semaglutide, 1 MG/DOSE, (OZEMPIC, 1 MG/DOSE,) 2 MG/1.5ML solution pen-injector; Inject 1 mg under the skin into the appropriate area as directed 1 (One) Time Per Week.  -     Fructosamine; Future  -     Comprehensive Metabolic Panel; Future  -     Hemoglobin A1c; Future  -     Lipid Panel; Future  -     Microalbumin / Creatinine Urine Ratio - Urine, Clean Catch; Future  -     Uric Acid; Future  -     Vitamin D 25 Hydroxy; Future  -     TSH; Future  -     T4, Free; Future  -     Thyroid Antibodies; Future  -     T3, Free; Future    Essential hypertension  -     Comprehensive Metabolic Panel; Future    Muscle weakness  -     Comprehensive Metabolic Panel; Future    Gait difficulty  -     Comprehensive Metabolic Panel; Future          Plan:   In summary/ Medication changes: I met with this patient is metabolically stable and doing well at this time.  Laboratory testing was reviewed dated 11.7.2019, discussed with questions and answers completed.  Discussed and formulate a treatment plan with patient, patient verbally stated understood all instructions.    1. Diagnoses and all orders for this visit:    Vitamin D deficiency, unspecified- chronic, stable no medication changes at this time. Refills prescribed. Future labs ordered for upcoming appointment and assessment.      Mixed hyperlipidemia= chronic, stable no medication changes at this time. Refills prescribed. Future labs ordered for upcoming appointment and assessment.      Type 2 diabetes mellitus with hyperglycemia, with long-term current use of insulin  (CMS/[facility])chronic, stable no medication changes at this time. Refills prescribed. Future labs ordered for upcoming appointment and assessment.   Changed the victozia to Ozempic for ease of therapy.         Essential hypertension- chronic, stable no medication changes at this time. Refills prescribed. Future labs ordered for upcoming appointment and assessment.       Muscle weakness- Gait difficulty- .new problem to this provider. Deferred to PCP, instructed patient to followup with PCP with sciatic treatment and further workup if indicated.          Instructions:     home blood tests -  Blood glucose testin times daily, that are:  fasting- 1st thing in morning before eating or drinking  before each meal and 1 or 2 hours after meal  bedtime  anytime you feel symptoms of hyperglycemia or hypoglycemia (high or low blood sugars)        Education:  interpretation of lab results, blood sugar goals, complications of diabetes mellitus, hypoglycemia prevention and treatment, exercise, illness management, self-monitoring of blood glucose skills, nutrition and carbohydrate counting        The total face to face time spent was  25 minutes  with additional education given: 14 minutes (greater than 50% of the total time) was spent with counseling and coordination of care on: SMBG with goals, Side effects profiles with medications, medication use and purposes,    Return in about 3 months (around 3/11/2020), or if symptoms worsen or fail to improve, for Recheck. 3 months with Heather-2 weeks prior for labs 6 months with Dr. Savage-2 weeks prior for labs    Dragon transcription disclaimer     Much of this encounter note is an electronic transcription/translation of spoken language to printed text. The electronic translation of spoken language may permit erroneous, or at times, nonsensical words or phrases to be inadvertently transcribed. Although I have reviewed the note for such errors, some may still exist.

## 2020-02-10 DIAGNOSIS — I10 ESSENTIAL HYPERTENSION: ICD-10-CM

## 2020-02-24 DIAGNOSIS — Z79.899 ON LONG TERM DRUG THERAPY: Primary | ICD-10-CM

## 2020-02-24 DIAGNOSIS — M54.30 SCIATICA, UNSPECIFIED LATERALITY: ICD-10-CM

## 2020-02-24 RX ORDER — GABAPENTIN 300 MG/1
300 CAPSULE ORAL 3 TIMES DAILY
Qty: 270 CAPSULE | Refills: 0 | Status: SHIPPED | OUTPATIENT
Start: 2020-02-24

## 2020-02-24 NOTE — TELEPHONE ENCOUNTER
LOV  11/7/2019  NOV  5/20/2020  UDS  NOT DONE  CONTRACT 1/9/2019  KSP  2/24/2020  LAST RF 10/31/2019    #[phone]

## 2020-03-10 ENCOUNTER — PATIENT MESSAGE (OUTPATIENT)
Dept: INTERNAL MEDICINE | Age: 75
End: 2020-03-10

## 2020-03-10 ENCOUNTER — TELEPHONE (OUTPATIENT)
Dept: INTERNAL MEDICINE | Age: 75
End: 2020-03-10

## 2020-03-10 NOTE — TELEPHONE ENCOUNTER
If he has never had colon polyps in the past and if there is no family history of colon cancer than the Cologuard test would be okay.  Otherwise if there is the personal history or family history then he would need a colonoscopy in the Cologuard test would be contraindicated.    Go ahead and take care of it as per request if he so qualifies

## 2020-03-10 NOTE — TELEPHONE ENCOUNTER
----- Message from Marisa Atkins sent at 3/10/2020  9:38 AM EDT -----  Regarding: RE: Non-Urgent Medical Question  Contact: [phone]  Great you so much Angie    ----- Message -----  From: KRYSTA Adams ARNOLDO ALTA  Sent: 3/10/20, 7:39 AM  To: Marisa Atkins  Subject: RE: Non-Urgent Medical Question    Mr. Atkins,  I have your handicap paperwork ready along with other papers you will be coming in for at the  when you check in. As for the cologuard that is an order that requires to be seen and placed by PCP. I can ask him if he would order this. If he does order it the test would actually come in the mail to your home by a specific company.  Thank you for your time.   Angie Esteban LPN    ----- Message -----     From: Marisa Atkins     Sent: 3/10/2020  5:26 AM EDT       To: Carlos A Edwards MD  Subject: Non-Urgent Medical Question    Angie I am scheduled to due a urine test and fill out a drug contract form tomorrow Wednesday March 11 can I  a cologuard screening test kit as well and a handicap application to renew my handicap permit. I have to do lab testing for my diabetic care doctor tomorrow at around 8 am so after I complete that testing I will come to doctor edwards  for my annual drug screening test thanks Angie for all your help I couldn't do it without you you are the greatest marisa bah 1945

## 2020-03-10 NOTE — TELEPHONE ENCOUNTER
Pt was notified via [facility] email questions that needed to be answered. Pt answered that he had a colonoscopy where they removed polyps and he will need to have updated colonoscopy vs cologuard. Pt verbalized understanding. MM

## 2020-03-11 ENCOUNTER — LAB (OUTPATIENT)
Dept: INTERNAL MEDICINE | Age: 75
End: 2020-03-11

## 2020-03-11 ENCOUNTER — RESULTS ENCOUNTER (OUTPATIENT)
Dept: ENDOCRINOLOGY | Age: 75
End: 2020-03-11

## 2020-03-11 DIAGNOSIS — M62.81 MUSCLE WEAKNESS: ICD-10-CM

## 2020-03-11 DIAGNOSIS — I10 ESSENTIAL HYPERTENSION: ICD-10-CM

## 2020-03-11 DIAGNOSIS — E78.2 MIXED HYPERLIPIDEMIA: ICD-10-CM

## 2020-03-11 DIAGNOSIS — E55.9 VITAMIN D DEFICIENCY, UNSPECIFIED: ICD-10-CM

## 2020-03-11 DIAGNOSIS — R53.82 CHRONIC FATIGUE: ICD-10-CM

## 2020-03-11 DIAGNOSIS — E78.2 MIXED HYPERLIPIDEMIA: Primary | ICD-10-CM

## 2020-03-11 DIAGNOSIS — E11.65 TYPE 2 DIABETES MELLITUS WITH HYPERGLYCEMIA, WITH LONG-TERM CURRENT USE OF INSULIN (HCC): ICD-10-CM

## 2020-03-11 DIAGNOSIS — Z79.4 TYPE 2 DIABETES MELLITUS WITH HYPERGLYCEMIA, WITH LONG-TERM CURRENT USE OF INSULIN (HCC): ICD-10-CM

## 2020-03-11 DIAGNOSIS — R26.9 GAIT DIFFICULTY: ICD-10-CM

## 2020-03-12 LAB
25(OH)D3+25(OH)D2 SERPL-MCNC: 96.1 NG/ML (ref 30–100)
ALBUMIN SERPL-MCNC: 4.4 G/DL (ref 3.5–5.2)
ALBUMIN/GLOB SERPL: 1.9 G/DL
ALP SERPL-CCNC: 64 U/L (ref 39–117)
ALT SERPL-CCNC: 14 U/L (ref 1–41)
AST SERPL-CCNC: 17 U/L (ref 1–40)
BILIRUB SERPL-MCNC: 0.6 MG/DL (ref 0.2–1.2)
BUN SERPL-MCNC: 28 MG/DL (ref 8–23)
BUN/CREAT SERPL: 19.3 (ref 7–25)
C PEPTIDE SERPL-MCNC: 3.6 NG/ML (ref 1.1–4.4)
CALCIUM SERPL-MCNC: 9.2 MG/DL (ref 8.6–10.5)
CHLORIDE SERPL-SCNC: 100 MMOL/L (ref 98–107)
CHOLEST SERPL-MCNC: 87 MG/DL (ref 0–200)
CO2 SERPL-SCNC: 27.2 MMOL/L (ref 22–29)
CREAT SERPL-MCNC: 1.45 MG/DL (ref 0.76–1.27)
GLOBULIN SER CALC-MCNC: 2.3 GM/DL
GLUCOSE SERPL-MCNC: 100 MG/DL (ref 65–99)
HBA1C MFR BLD: 5.9 % (ref 4.8–5.6)
HDLC SERPL-MCNC: 31 MG/DL (ref 40–60)
INTERPRETATION: NORMAL
LDLC SERPL CALC-MCNC: 29 MG/DL (ref 0–100)
Lab: NORMAL
POTASSIUM SERPL-SCNC: 5.9 MMOL/L (ref 3.5–5.2)
PROT SERPL-MCNC: 6.7 G/DL (ref 6–8.5)
SODIUM SERPL-SCNC: 138 MMOL/L (ref 136–145)
T3FREE SERPL-MCNC: 3.3 PG/ML (ref 2–4.4)
T4 FREE SERPL-MCNC: 1.13 NG/DL (ref 0.93–1.7)
T4 SERPL-MCNC: 7.18 MCG/DL (ref 4.5–11.7)
TRIGL SERPL-MCNC: 135 MG/DL (ref 0–150)
TSH SERPL DL<=0.005 MIU/L-ACNC: 1.89 UIU/ML (ref 0.27–4.2)
UNABLE TO VOID: NORMAL
URATE SERPL-MCNC: 6.2 MG/DL (ref 3.4–7)
VLDLC SERPL CALC-MCNC: 27 MG/DL

## 2020-03-16 LAB — DRUGS UR: NORMAL

## 2020-03-16 RX ORDER — FINASTERIDE 5 MG/1
TABLET, FILM COATED ORAL
Qty: 90 TABLET | Refills: 3 | Status: SHIPPED | OUTPATIENT
Start: 2020-03-16

## 2020-03-17 LAB — MICROALBUMIN UR-MCNC: 5.5 UG/ML

## 2020-03-18 ENCOUNTER — TELEPHONE (OUTPATIENT)
Dept: ENDOCRINOLOGY | Age: 75
End: 2020-03-18

## 2020-03-18 NOTE — TELEPHONE ENCOUNTER
Pt calling wanting lab results mailed to him I do not see where they have been signed off or verified by physician

## 2020-03-20 DIAGNOSIS — E78.2 MIXED HYPERLIPIDEMIA: ICD-10-CM

## 2020-03-20 DIAGNOSIS — E11.65 TYPE 2 DIABETES MELLITUS WITH HYPERGLYCEMIA, WITH LONG-TERM CURRENT USE OF INSULIN (HCC): ICD-10-CM

## 2020-03-20 DIAGNOSIS — Z79.4 TYPE 2 DIABETES MELLITUS WITH HYPERGLYCEMIA, WITH LONG-TERM CURRENT USE OF INSULIN (HCC): ICD-10-CM

## 2020-03-20 RX ORDER — ROSUVASTATIN CALCIUM 40 MG/1
40 TABLET, COATED ORAL DAILY
Qty: 30 TABLET | Refills: 0 | Status: SHIPPED | OUTPATIENT
Start: 2020-03-20 | End: 2020-03-25 | Stop reason: SDUPTHER

## 2020-03-25 ENCOUNTER — OFFICE VISIT (OUTPATIENT)
Dept: ENDOCRINOLOGY | Age: 75
End: 2020-03-25

## 2020-03-25 VITALS
WEIGHT: 315 LBS | SYSTOLIC BLOOD PRESSURE: 124 MMHG | OXYGEN SATURATION: 95 % | HEART RATE: 68 BPM | HEIGHT: 78 IN | BODY MASS INDEX: 36.45 KG/M2 | DIASTOLIC BLOOD PRESSURE: 60 MMHG

## 2020-03-25 DIAGNOSIS — I25.10 CORONARY ARTERIOSCLEROSIS IN NATIVE ARTERY: ICD-10-CM

## 2020-03-25 DIAGNOSIS — E78.2 MIXED HYPERLIPIDEMIA: ICD-10-CM

## 2020-03-25 DIAGNOSIS — I10 ESSENTIAL HYPERTENSION: ICD-10-CM

## 2020-03-25 DIAGNOSIS — E11.65 TYPE 2 DIABETES MELLITUS WITH HYPERGLYCEMIA, WITH LONG-TERM CURRENT USE OF INSULIN (HCC): Primary | ICD-10-CM

## 2020-03-25 DIAGNOSIS — Z79.4 TYPE 2 DIABETES MELLITUS WITH HYPERGLYCEMIA, WITH LONG-TERM CURRENT USE OF INSULIN (HCC): Primary | ICD-10-CM

## 2020-03-25 DIAGNOSIS — R53.82 CHRONIC FATIGUE: ICD-10-CM

## 2020-03-25 DIAGNOSIS — E55.9 VITAMIN D DEFICIENCY, UNSPECIFIED: ICD-10-CM

## 2020-03-25 PROCEDURE — 99214 OFFICE O/P EST MOD 30 MIN: CPT | Performed by: INTERNAL MEDICINE

## 2020-03-25 RX ORDER — SEMAGLUTIDE 1.34 MG/ML
1 INJECTION, SOLUTION SUBCUTANEOUS WEEKLY
Qty: 6 PEN | Refills: 3 | Status: SHIPPED | OUTPATIENT
Start: 2020-03-25

## 2020-03-25 RX ORDER — INSULIN DEGLUDEC 200 U/ML
90 INJECTION, SOLUTION SUBCUTANEOUS
Qty: 15 PEN | Refills: 3 | Status: SHIPPED | OUTPATIENT
Start: 2020-03-25 | End: 2020-03-27 | Stop reason: SDUPTHER

## 2020-03-25 RX ORDER — ERGOCALCIFEROL 1.25 MG/1
50000 CAPSULE ORAL 2 TIMES WEEKLY
Qty: 26 CAPSULE | Refills: 3 | Status: SHIPPED | OUTPATIENT
Start: 2020-03-26 | End: 2020-03-26 | Stop reason: SDUPTHER

## 2020-03-25 RX ORDER — PEN NEEDLE, DIABETIC 32GX 5/32"
NEEDLE, DISPOSABLE MISCELLANEOUS
COMMUNITY
Start: 2020-03-21

## 2020-03-25 RX ORDER — EMPAGLIFLOZIN, METFORMIN HYDROCHLORIDE 12.5; 1 MG/1; MG/1
2 TABLET, EXTENDED RELEASE ORAL DAILY
Qty: 180 TABLET | Refills: 3 | Status: SHIPPED | OUTPATIENT
Start: 2020-03-25

## 2020-03-25 RX ORDER — ROSUVASTATIN CALCIUM 40 MG/1
40 TABLET, COATED ORAL DAILY
Qty: 90 TABLET | Refills: 3 | Status: SHIPPED | OUTPATIENT
Start: 2020-03-25

## 2020-03-25 NOTE — PROGRESS NOTES
"Subjective   Dirk Atkins is a 74 y.o. male  PT HERE FOR DIABETIC FUP LAB REVIEW PT TESTS BS 2 -3 TIMES DAILY     History of Present Illness this is a 74-year-old gentleman known patient with type 2 diabetes hypertension and dyslipidemia as well as coronary and peripheral vascular disease.  Over the course of last 6 months he has had no significant health problem for which to go to the ER or hospital.  As far as his complaint he said weight problem has always been with him and his shortness of breath is a lot better on his current medications.  And by virtue of taking Synjardy he has urinary frequency.    The following portions of the patient's history were reviewed and updated as appropriate: allergies, current medications, past family history, past medical history, past social history, past surgical history and problem list.    Review of Systems   Constitutional: Positive for unexpected weight change.   HENT: Negative.    Respiratory: Positive for shortness of breath.    Cardiovascular: Negative.    Gastrointestinal: Negative.    Endocrine: Positive for heat intolerance.   Genitourinary: Positive for frequency.   Musculoskeletal: Negative.    Skin: Negative.    Allergic/Immunologic: Negative.    Neurological: Positive for dizziness.   Hematological: Negative.    Psychiatric/Behavioral: Negative.    The above-mentioned reviews of system were reviewed and corroborated and accepted.  /60   Pulse 68   Ht 198.1 cm (78\")   Wt (!) 157 kg (345 lb 9.6 oz)   SpO2 95%   BMI 39.94 kg/m²   Allergies   Allergen Reactions   • Clopidogrel Hives   • Prasugrel Hives       Current Outpatient Medications:   •  aspirin 325 MG tablet, Take 325 mg by mouth daily., Disp: , Rfl:   •  Blood Glucose Calibration (ACCU-CHEK SMARTVIEW CONTROL VI), 2 (two) times a day., Disp: , Rfl:   •  Blood Glucose Monitoring Suppl (ACCU-CHEK CUBA PLUS) w/Device kit, Use kit to test blood glucose 4 times daily DX: e11.65, Disp: 1 kit, Rfl: " 0  •  Cholecalciferol (VITAMIN D3) 52187 units capsule, TAKE 1 CAPSULE TWICE WEEKLY, Disp: 24 capsule, Rfl: 2  •  DULoxetine (CYMBALTA) 30 MG capsule, TAKE 1 CAPSULE EVERY 12 HOURS, Disp: 180 capsule, Rfl: 3  •  Empagliflozin-metFORMIN HCl 12.5-1000 MG tablet, Take 12.5 mg by mouth 2 (Two) Times a Day., Disp: 60 tablet, Rfl: 0  •  finasteride (PROSCAR) 5 MG tablet, TAKE 1 TABLET EVERY DAY, Disp: 90 tablet, Rfl: 3  •  gabapentin (NEURONTIN) 300 MG capsule, Take 1 capsule by mouth 3 (Three) Times a Day., Disp: 270 capsule, Rfl: 0  •  glucose blood (ACCU-CHEK CUBA PLUS) test strip, TEST BLOOD GLUCOSE FOUR TIMES DAILY, Disp: 400 each, Rfl: 1  •  Insulin Degludec (TRESIBA FLEXTOUCH) 200 UNIT/ML solution pen-injector pen injection, Titrate as instructed with max of 100 units daily, Disp: 5 pen, Rfl: 0  •  Insulin Pen Needle (NovoFine) 32G X 6 MM misc, 2 inj daily, Disp: 200 each, Rfl: 0  •  isosorbide mononitrate (IMDUR) 30 MG 24 hr tablet, TAKE 1 TABLET EVERY DAY, Disp: 90 tablet, Rfl: 0  •  Lancet Devices (ACCU-CHEK SOFTCLIX) lancets, Test blood glucose 4 times daily dx:e11.65, Disp: 400 each, Rfl: 2  •  lisinopril (PRINIVIL,ZESTRIL) 2.5 MG tablet, 2.5 mg daily., Disp: , Rfl:   •  metoprolol tartrate (LOPRESSOR) 25 MG tablet, TAKE 1 TABLET TWICE DAILY, Disp: 180 tablet, Rfl: 3  •  RELION PEN NEEDLES 32G X 4 MM misc, , Disp: , Rfl:   •  rosuvastatin (CRESTOR) 40 MG tablet, Take 1 tablet by mouth Daily., Disp: 30 tablet, Rfl: 0  •  Semaglutide, 1 MG/DOSE, (Ozempic, 1 MG/DOSE,) 2 MG/1.5ML solution pen-injector, Inject 1 mg under the skin into the appropriate area as directed 1 (One) Time Per Week., Disp: 2 pen, Rfl: 0  •  tamsulosin (FLOMAX) 0.4 MG capsule 24 hr capsule, TAKE 1 CAPSULE EVERY DAY, Disp: 90 capsule, Rfl: 1  •  nitroglycerin (NITROSTAT) 0.4 MG SL tablet, Place 0.4 mg under the tongue., Disp: , Rfl: .  Objective   Physical Exam   Constitutional: He is oriented to person, place, and time. He appears  well-developed and well-nourished. No distress.   HENT:   Head: Normocephalic and atraumatic.   Right Ear: External ear normal.   Left Ear: External ear normal.   Nose: Nose normal.   Mouth/Throat: Oropharynx is clear and moist. No oropharyngeal exudate.   Edentulous.   Eyes: Pupils are equal, round, and reactive to light. Conjunctivae and EOM are normal. Right eye exhibits no discharge. Left eye exhibits no discharge. No scleral icterus.   Neck: Normal range of motion. Neck supple. No JVD present. No tracheal deviation present. No thyromegaly present.   Cardiovascular: Normal rate, regular rhythm, normal heart sounds and intact distal pulses. Exam reveals no gallop and no friction rub.   No murmur heard.  Pulmonary/Chest: Effort normal and breath sounds normal. No stridor. No respiratory distress. He has no wheezes. He has no rales. He exhibits no tenderness.   Abdominal: Soft. Bowel sounds are normal. He exhibits no distension and no mass. There is no tenderness. There is no rebound and no guarding. No hernia.   Musculoskeletal: Normal range of motion. He exhibits no edema, tenderness or deformity.   Lymphadenopathy:     He has no cervical adenopathy.   Neurological: He is alert and oriented to person, place, and time. He displays normal reflexes. No cranial nerve deficit or sensory deficit. He exhibits normal muscle tone. Coordination normal.   Skin: Skin is warm and dry. No rash noted. He is not diaphoretic. No erythema. No pallor.   Psychiatric: He has a normal mood and affect. His behavior is normal. Judgment and thought content normal.   Nursing note and vitals reviewed.        Assessment/Plan   Dirk was seen today for diabetes.    Diagnoses and all orders for this visit:    Type 2 diabetes mellitus with hyperglycemia, with long-term current use of insulin (CMS/[facility])  -     OZEMPIC, 1 MG/DOSE, 2 MG/1.5ML solution pen-injector; Inject 1 mg under the skin into the appropriate area as directed 1 (One) Time Per  Week.  -     T4 & TSH ([facility]); Future  -     Uric Acid; Future  -     Vitamin D 25 Hydroxy; Future  -     Comprehensive Metabolic Panel; Future  -     C-Peptide; Future  -     Hemoglobin A1c; Future  -     MicroAlbumin, Urine, Random - Urine, Clean Catch; Future  -     NMR LipoProfile; Future    Coronary arteriosclerosis in native artery  -     T4 & TSH ([facility]); Future  -     Uric Acid; Future  -     Vitamin D 25 Hydroxy; Future  -     Comprehensive Metabolic Panel; Future  -     C-Peptide; Future  -     Hemoglobin A1c; Future  -     MicroAlbumin, Urine, Random - Urine, Clean Catch; Future  -     NMR LipoProfile; Future    Mixed hyperlipidemia  -     rosuvastatin (CRESTOR) 40 MG tablet; Take 1 tablet by mouth Daily.  -     T4 & TSH ([facility]); Future  -     Uric Acid; Future  -     Vitamin D 25 Hydroxy; Future  -     Comprehensive Metabolic Panel; Future  -     C-Peptide; Future  -     Hemoglobin A1c; Future  -     MicroAlbumin, Urine, Random - Urine, Clean Catch; Future  -     NMR LipoProfile; Future    Essential hypertension  -     T4 & TSH ([facility]); Future  -     Uric Acid; Future  -     Vitamin D 25 Hydroxy; Future  -     Comprehensive Metabolic Panel; Future  -     C-Peptide; Future  -     Hemoglobin A1c; Future  -     MicroAlbumin, Urine, Random - Urine, Clean Catch; Future  -     NMR LipoProfile; Future    Vitamin D deficiency, unspecified  -     T4 & TSH ([facility]); Future  -     Uric Acid; Future  -     Vitamin D 25 Hydroxy; Future  -     Comprehensive Metabolic Panel; Future  -     C-Peptide; Future  -     Hemoglobin A1c; Future  -     MicroAlbumin, Urine, Random - Urine, Clean Catch; Future  -     NMR LipoProfile; Future    Chronic fatigue  -     T4 & TSH ([facility]); Future  -     Uric Acid; Future  -     Vitamin D 25 Hydroxy; Future  -     Comprehensive Metabolic Panel; Future  -     C-Peptide; Future  -     Hemoglobin A1c; Future  -     MicroAlbumin, Urine, Random - Urine, Clean Catch; Future  -      NMR LipoProfile; Future    Other orders  -     ergocalciferol (Drisdol) 1.25 MG (20074 UT) capsule; Take 1 capsule by mouth 2 (Two) Times a Week.  -     SYNJARDY XR 12.5-1000 MG tablet sustained-release 24 hour; Take 2 tablets by mouth Daily.  -     TRESIBA FLEXTOUCH 200 UNIT/ML solution pen-injector pen injection; Inject 90 Units under the skin into the appropriate area as directed Daily With Breakfast.      In summary I saw and examined this 74-year-old gentleman for above-mentioned problems.  I reviewed his laboratory evaluation of 3/11/2020 and provided him with a hard copy of it.  Overall he is clinically and metabolically stable with the exception of a rise in his creatinine compared to his previous studies.  He admits that when he leaves home for the fear of the having to urinate he uses as little water as possible and on 3/11/2020 that was the same story.  For his Tresiba I asked him to because of his morning blood sugars are fairly low to cut back from 90 units to 70 and then work himself up every 3 days and increase by 2 units if his fasting blood sugar is greater than 110.  We also provided him with a bottle of water and we will recheck his CMP to know about the accuracy of his kidney function.

## 2020-03-26 LAB
ALBUMIN SERPL-MCNC: 4.4 G/DL (ref 3.5–5.2)
ALBUMIN/GLOB SERPL: 1.8 G/DL
ALP SERPL-CCNC: 58 U/L (ref 39–117)
ALT SERPL-CCNC: 15 U/L (ref 1–41)
AST SERPL-CCNC: 14 U/L (ref 1–40)
BILIRUB SERPL-MCNC: 0.5 MG/DL (ref 0.2–1.2)
BUN SERPL-MCNC: 16 MG/DL (ref 8–23)
BUN/CREAT SERPL: 15.8 (ref 7–25)
CALCIUM SERPL-MCNC: 9.6 MG/DL (ref 8.6–10.5)
CHLORIDE SERPL-SCNC: 98 MMOL/L (ref 98–107)
CO2 SERPL-SCNC: 29.3 MMOL/L (ref 22–29)
CREAT SERPL-MCNC: 1.01 MG/DL (ref 0.76–1.27)
GLOBULIN SER CALC-MCNC: 2.5 GM/DL
GLUCOSE SERPL-MCNC: 141 MG/DL (ref 65–99)
POTASSIUM SERPL-SCNC: 5.2 MMOL/L (ref 3.5–5.2)
PROT SERPL-MCNC: 6.9 G/DL (ref 6–8.5)
SODIUM SERPL-SCNC: 138 MMOL/L (ref 136–145)

## 2020-03-26 RX ORDER — ERGOCALCIFEROL 1.25 MG/1
50000 CAPSULE ORAL 2 TIMES WEEKLY
Qty: 26 CAPSULE | Refills: 3 | Status: SHIPPED | OUTPATIENT
Start: 2020-03-26 | End: 2021-03-26

## 2020-03-27 RX ORDER — INSULIN DEGLUDEC 200 U/ML
90 INJECTION, SOLUTION SUBCUTANEOUS
Qty: 15 PEN | Refills: 3 | Status: SHIPPED | OUTPATIENT
Start: 2020-03-27

## 2020-03-28 DIAGNOSIS — N40.0 ENLARGED PROSTATE WITHOUT LOWER URINARY TRACT SYMPTOMS (LUTS): ICD-10-CM

## 2020-03-30 RX ORDER — TAMSULOSIN HYDROCHLORIDE 0.4 MG/1
CAPSULE ORAL
Qty: 90 CAPSULE | Refills: 1 | Status: SHIPPED | OUTPATIENT
Start: 2020-03-30 | End: 2020-07-30

## 2020-05-01 ENCOUNTER — OFFICE VISIT (OUTPATIENT)
Dept: INTERNAL MEDICINE | Age: 75
End: 2020-05-01

## 2020-05-01 VITALS
BODY MASS INDEX: 36.45 KG/M2 | HEIGHT: 78 IN | SYSTOLIC BLOOD PRESSURE: 134 MMHG | DIASTOLIC BLOOD PRESSURE: 78 MMHG | OXYGEN SATURATION: 97 % | WEIGHT: 315 LBS | TEMPERATURE: 96.4 F | RESPIRATION RATE: 13 BRPM | HEART RATE: 68 BPM

## 2020-05-01 DIAGNOSIS — Z79.4 TYPE 2 DIABETES MELLITUS WITH HYPERGLYCEMIA, WITH LONG-TERM CURRENT USE OF INSULIN (HCC): ICD-10-CM

## 2020-05-01 DIAGNOSIS — I10 ESSENTIAL HYPERTENSION: Primary | ICD-10-CM

## 2020-05-01 DIAGNOSIS — D12.2 ADENOMATOUS POLYP OF ASCENDING COLON: ICD-10-CM

## 2020-05-01 DIAGNOSIS — E11.65 TYPE 2 DIABETES MELLITUS WITH HYPERGLYCEMIA, WITH LONG-TERM CURRENT USE OF INSULIN (HCC): ICD-10-CM

## 2020-05-01 PROBLEM — D12.6 ADENOMATOUS POLYP OF COLON: Status: ACTIVE | Noted: 2020-05-01

## 2020-05-01 PROCEDURE — 99214 OFFICE O/P EST MOD 30 MIN: CPT | Performed by: INTERNAL MEDICINE

## 2020-05-01 NOTE — PROGRESS NOTES
"  Dirk Atkins is a 74 y.o. male who presents with   Chief Complaint   Patient presents with   • Hypertension     Lisinopril 2.5 mg; metoprolol tartrate 25 mg.  Home blood pressure readings consistently around 130/80 or less he says.   • Diabetes/hyperlipidemia     Endocrinology management, treatment and lab testing via [facility]   • Colon Polyps     Colonoscopy July 2018-Dr. Cruz Romo.  7 adenomatous polyps removed from the ascending colon, transverse colon and rectum.  Patient says follow-up colonoscopy due July 2021   .    74-year-old male presents for his 6-month checkup.  He has no complaints on today's visit.  He was last seen November 7, 2019 and was complaining of some shortness of breath then.  He was referred back to his cardiologist and he says he saw the nurse practitioner of the cardiology group.  An echocardiogram was performed and was negative and he was told that it may be his lungs.  He was referred for a pulmonary function study and was scheduled for March to have that done however the coronavirus pandemic struck and his procedure was canceled and rescheduled until May 19.  The coronavirus was still active at that point and his pulmonary function studies again were canceled and he said they were going to be rescheduled for September but he said \"the hell with it\" and decided to forego the procedure.  He said he will \"get into it another time\".  It is also noted that he is following a \"vegetarian diet\" and his weight has decreased from 354 pounds in December to his current weight of 334 pounds today.    Hypertension   This is a chronic problem. The current episode started more than 1 year ago. The problem is unchanged. The problem is controlled. Current antihypertension treatment includes ACE inhibitors and beta blockers. The current treatment provides moderate improvement. Compliance problems include exercise.       Diabetes/hyperlipidemia: History as above.    Colon polyps: " "History as above  /78   Pulse 68   Temp 96.4 °F (35.8 °C) (Temporal)   Resp 13   Ht 198.1 cm (77.99\")   Wt (!) 152 kg (334 lb)   SpO2 97%   BMI 38.61 kg/m²     The following portions of the patient's history were reviewed and updated as appropriate: allergies, current medications, past medical history and problem list.    Review of Systems   Constitutional: Negative.    HENT: Negative.    Eyes: Negative.    Respiratory: Negative.    Cardiovascular: Negative.    Genitourinary: Negative.    Musculoskeletal: Negative.    Skin: Negative.    Neurological: Negative.    Psychiatric/Behavioral: Negative.        Objective   Physical Exam   Constitutional: He is oriented to person, place, and time. He appears well-developed and well-nourished. No distress.   HENT:   Head: Normocephalic and atraumatic.   Eyes: Pupils are equal, round, and reactive to light. Conjunctivae and EOM are normal.   Neck: Normal range of motion. Neck supple. No thyromegaly present.   Neck exam negative.  Carotid auscultation normal-no bruits heard.   Cardiovascular: Normal rate, regular rhythm, normal heart sounds and intact distal pulses. Exam reveals no gallop and no friction rub.   No murmur heard.  Pulmonary/Chest: Effort normal and breath sounds normal. No respiratory distress. He has no wheezes. He has no rales. He exhibits no tenderness.   Neurological: He is alert and oriented to person, place, and time.   Psychiatric: He has a normal mood and affect. His behavior is normal. Judgment and thought content normal.   Nursing note and vitals reviewed.      Assessment/Plan   Dirk was seen today for hypertension, diabetes/hyperlipidemia and colon polyps.    Diagnoses and all orders for this visit:    Essential hypertension    Type 2 diabetes mellitus with hyperglycemia, with long-term current use of insulin (CMS/[facility])    Adenomatous polyp of ascending colon        Plan: Blood pressure stable.  Continue current treatment.  Recheck in the " office in 6 months.    He will continue diabetic care, treatment and management of his diabetes and hyperlipidemia with [facility].  Labs from March were done through that office and were reviewed on today's visit and all appears to be acceptable.    Colon polyps: History as above.  He says he is due another colonoscopy sometime in July 2021 and he will make those arrangements himself he says unless he needs a referral from a

## 2020-05-28 DIAGNOSIS — M25.572 ACUTE BILATERAL ANKLE PAIN: ICD-10-CM

## 2020-05-28 DIAGNOSIS — M25.571 ACUTE BILATERAL ANKLE PAIN: ICD-10-CM

## 2020-05-28 RX ORDER — DULOXETIN HYDROCHLORIDE 30 MG/1
CAPSULE, DELAYED RELEASE ORAL
Qty: 180 CAPSULE | Refills: 3 | Status: SHIPPED | OUTPATIENT
Start: 2020-05-28

## 2020-06-22 DIAGNOSIS — I25.10 CORONARY ARTERIOSCLEROSIS: ICD-10-CM

## 2020-06-22 RX ORDER — ISOSORBIDE MONONITRATE 30 MG/1
30 TABLET, EXTENDED RELEASE ORAL DAILY
Qty: 90 TABLET | Refills: 2 | Status: SHIPPED | OUTPATIENT
Start: 2020-06-22

## 2020-07-01 ENCOUNTER — TELEPHONE (OUTPATIENT)
Dept: INTERNAL MEDICINE | Age: 75
End: 2020-07-01

## 2020-07-01 NOTE — TELEPHONE ENCOUNTER
PT CALLED THIS MORNING COMPLAINING OF DIZZINESS AND HEADACHE AND SOME NAUSEA.  SPOKE WITH DR PENA ASSISTANT AND WAS TOLD TO INSTRUCT PATIENT TO GO TO THE ER.  PT WAS INFORMED AND SAID HE WILL GO.

## 2020-07-30 DIAGNOSIS — N40.0 ENLARGED PROSTATE WITHOUT LOWER URINARY TRACT SYMPTOMS (LUTS): ICD-10-CM

## 2020-07-30 RX ORDER — TAMSULOSIN HYDROCHLORIDE 0.4 MG/1
CAPSULE ORAL
Qty: 90 CAPSULE | Refills: 1 | Status: SHIPPED | OUTPATIENT
Start: 2020-07-30

## 2020-09-11 ENCOUNTER — RESULTS ENCOUNTER (OUTPATIENT)
Dept: ENDOCRINOLOGY | Age: 75
End: 2020-09-11

## 2020-09-11 DIAGNOSIS — E11.65 TYPE 2 DIABETES MELLITUS WITH HYPERGLYCEMIA, WITH LONG-TERM CURRENT USE OF INSULIN (HCC): ICD-10-CM

## 2020-09-11 DIAGNOSIS — Z79.4 TYPE 2 DIABETES MELLITUS WITH HYPERGLYCEMIA, WITH LONG-TERM CURRENT USE OF INSULIN (HCC): ICD-10-CM

## 2020-09-11 DIAGNOSIS — R53.82 CHRONIC FATIGUE: ICD-10-CM

## 2020-09-11 DIAGNOSIS — E78.2 MIXED HYPERLIPIDEMIA: ICD-10-CM

## 2020-09-11 DIAGNOSIS — E55.9 VITAMIN D DEFICIENCY, UNSPECIFIED: ICD-10-CM

## 2020-09-11 DIAGNOSIS — I10 ESSENTIAL HYPERTENSION: ICD-10-CM

## 2020-09-11 DIAGNOSIS — I25.10 CORONARY ARTERIOSCLEROSIS IN NATIVE ARTERY: ICD-10-CM

## 2021-03-09 DIAGNOSIS — Z23 IMMUNIZATION DUE: ICD-10-CM

## 2024-03-27 NOTE — PROGRESS NOTES
CAD (coronary artery disease) Dirk Atkins  presents to the office  for the follow-up appointment for Type 2 diabetes mellitus.     The diabete's condition location is throughout the system with the  clinical course has fluctuated, the severity is Mild, and the modifying/allievating factors are insulin.  Medications and  Dosages were  reviewed with Dirk Atkins and suggested that compliance most of the time.    The patient reports associated symptoms of hyperglycemia have been faint and associated symptoms of hypoglycemia have been none, with their  hypoglycemia threshold for symptoms is n/a mg/dl .     The patient is currently on oral medications .      Compliance with blood glucose monitoring: good.     Meal panning: The patient is using avoidance of concentrated sweets.    The patient is currently taking home blood tests - Blood glucose testin-2 times daily, that are:  fasting- 1st thing in morning before eating or drinking  bedtime   basal insulIn  Tresiba U200 70 units in AM     Last instructions given were:  continue Victoza until complete then changed to Ozempic     ozempic start as  0.5 mg injection weekly for 5  weeks maintenance dose will be 1 mg     Continue Synjardy     Start Starlix 120 mg 1 before each meal or at meal or immediately after meal within 10 minutes.  If it is lighter meals such as a half a sandwich and a salad take half a tablet if you do not eat you do not take this tablet.    Home blood glucose testing daily: 1-2  FB to 250 mg/dl   after dinner/supper 160 to 170 mg/dl    Last reported blood glucose readings are:  Home blood glucose testing daily: 1-3  FB to 130 mg/dl- average 101-116mg/dl  before dinner/supper 100-120 to 200 mg/dl  After meals 183-205mg/dl    Patterns reported per patient are none.         The following portions of the patient's history were reviewed and updated as appropriate: current medications, past family history, past medical history, past social history, past surgical  history and problem list.      Current Outpatient Medications:   •  aspirin 325 MG tablet, Take 325 mg by mouth daily., Disp: , Rfl:   •  Blood Glucose Calibration (ACCU-CHEK SMARTVIEW CONTROL VI), 2 (two) times a day., Disp: , Rfl:   •  Blood Glucose Monitoring Suppl (ACCU-CHEK CUBA PLUS) w/Device kit, Use kit to test blood glucose 4 times daily DX: e11.65, Disp: 1 kit, Rfl: 0  •  Cholecalciferol (VITAMIN D3) 11021 units capsule, TAKE 1 CAPSULE TWICE WEEKLY, Disp: 24 capsule, Rfl: 2  •  DULoxetine (CYMBALTA) 30 MG capsule, TAKE 1 CAPSULE EVERY 12 HOURS, Disp: 180 capsule, Rfl: 3  •  finasteride (PROSCAR) 5 MG tablet, TAKE 1 TABLET EVERY DAY, Disp: 90 tablet, Rfl: 0  •  gabapentin (NEURONTIN) 300 MG capsule, Take 1 capsule by mouth 3 (Three) Times a Day., Disp: 270 capsule, Rfl: 0  •  glucose blood (ACCU-CHEK CUBA PLUS) test strip, Use to test BG 4 times daily. DX Code:e11.65, Disp: 400 each, Rfl: 1  •  Insulin Degludec 200 UNIT/ML solution pen-injector, Inject 54 Units under the skin into the appropriate area as directed Daily. Titrate as instructed with max of 100 units daily, Disp: 9 pen, Rfl: 4  •  isosorbide mononitrate (IMDUR) 30 MG 24 hr tablet, TAKE 1 TABLET EVERY DAY, Disp: 90 tablet, Rfl: 0  •  Lancet Devices (ACCU-CHEK SOFTCLIX) lancets, Test blood glucose 4 times daily dx:e11.65, Disp: 400 each, Rfl: 2  •  Liraglutide (VICTOZA) 18 MG/3ML solution pen-injector injection, Inject 1.8 mg under the skin into the appropriate area as directed Daily., Disp: 27 mL, Rfl: 1  •  lisinopril (PRINIVIL,ZESTRIL) 2.5 MG tablet, 2.5 mg daily., Disp: , Rfl:   •  metoprolol tartrate (LOPRESSOR) 25 MG tablet, TAKE 1 TABLET TWICE DAILY, Disp: 180 tablet, Rfl: 0  •  nitroglycerin (NITROSTAT) 0.4 MG SL tablet, Place 0.4 mg under the tongue., Disp: , Rfl:   •  NOVOFINE 32G X 6 MM misc, USE WITH INSULIN PEN AS DIRECTED, Disp: 100 each, Rfl: 3  •  rosuvastatin (CRESTOR) 40 MG tablet, Take 1 tablet by mouth Daily., Disp: 90  "tablet, Rfl: 1  •  tamsulosin (FLOMAX) 0.4 MG capsule 24 hr capsule, TAKE 1 CAPSULE EVERY DAY, Disp: 90 capsule, Rfl: 1  •  Empagliflozin-metFORMIN HCl 12.5-1000 MG tablet, Take 12.5 mg by mouth 2 (Two) Times a Day., Disp: 60 tablet, Rfl: 4    Patient Active Problem List    Diagnosis   • Vitamin D deficiency, unspecified [E55.9]   • Sciatica of left side [M54.32]   • Screen for colon cancer [Z12.11]   • Chronic fatigue [R53.82]   • Hypersomnia [G47.10]   • Myocardial infarct, old [I25.2]   • Chronic tension headache [G44.229]   • B12 deficiency [E53.8]   • Acute conjunctivitis [H10.30]   • Anxiety [F41.9]   • Coronary arteriosclerosis in native artery [I25.10]   • Edema [R60.9]   • Enlarged prostate [N40.0]   • Mixed hyperlipidemia [E78.2]   • Essential hypertension [I10]   • Leukocytosis [D72.829]   • Palpitations [R00.2]   • Type 2 diabetes mellitus with hyperglycemia, with long-term current use of insulin (CMS/[facility]) [E11.65, Z79.4]   • Chronic intractable headache [R51]   • Memory impairment [R41.3]   • Obstructive sleep apnea [G47.33]       Review of Systems   A comprehensive review of the 14 systems was negative except of listed below:  Endocrine: hyperglycemia     Objective:     Wt Readings from Last 3 Encounters:   08/07/19 (!) 160 kg (353 lb)   05/03/19 (!) 159 kg (350 lb)   04/24/19 (!) 159 kg (351 lb)     Temp Readings from Last 3 Encounters:   05/03/19 97.4 °F (36.3 °C)   09/12/18 96.5 °F (35.8 °C)   07/23/18 96.3 °F (35.7 °C)     BP Readings from Last 3 Encounters:   05/03/19 110/70   04/24/19 124/78   01/16/19 118/74     Pulse Readings from Last 3 Encounters:   05/03/19 64   09/12/18 69   07/23/18 75        Ht 198.1 cm (77.99\")   Wt (!) 160 kg (353 lb)   BMI 40.80 kg/m²        Physical Exam   Constitutional: He is oriented to person, place, and time. He appears well-developed and well-nourished. No distress.   HENT:   Head: Normocephalic and atraumatic.   Eyes: EOM are normal. Pupils are equal, round, " CAD (coronary artery disease) and reactive to light.   Neck: Normal range of motion. Neck supple. No thyromegaly present.   Cardiovascular: Normal rate, regular rhythm, normal heart sounds and intact distal pulses.   No murmur heard.  Pulmonary/Chest: Effort normal and breath sounds normal.   Abdominal: Soft. Bowel sounds are normal.   Musculoskeletal: Normal range of motion.   Neurological: He is alert and oriented to person, place, and time.   Skin: Skin is warm and dry. Capillary refill takes 2 to 3 seconds. He is not diaphoretic.   Psychiatric: He has a normal mood and affect. His behavior is normal. Judgment and thought content normal.   Nursing note and vitals reviewed.          Lab Review  Results for orders placed or performed in visit on 07/24/19   Comprehensive Metabolic Panel   Result Value Ref Range    Glucose 108 (H) 65 - 99 mg/dL    BUN 10 8 - 23 mg/dL    Creatinine 0.89 0.76 - 1.27 mg/dL    eGFR Non African Am 84 >60 mL/min/1.73    eGFR African Am 101 >60 mL/min/1.73    BUN/Creatinine Ratio 11.2 7.0 - 25.0    Sodium 141 136 - 145 mmol/L    Potassium 5.5 (H) 3.5 - 5.2 mmol/L    Chloride 101 98 - 107 mmol/L    Total CO2 26.1 22.0 - 29.0 mmol/L    Calcium 9.4 8.6 - 10.5 mg/dL    Total Protein 6.7 6.0 - 8.5 g/dL    Albumin 4.70 3.50 - 5.20 g/dL    Globulin 2.0 gm/dL    A/G Ratio 2.4 g/dL    Total Bilirubin 0.6 0.2 - 1.2 mg/dL    Alkaline Phosphatase 63 39 - 117 U/L    AST (SGOT) 15 1 - 40 U/L    ALT (SGPT) 11 1 - 41 U/L   C-Peptide   Result Value Ref Range    C-Peptide 1.6 1.1 - 4.4 ng/mL   Hemoglobin A1c   Result Value Ref Range    Hemoglobin A1C 6.60 (H) 4.80 - 5.60 %   Lipid Panel   Result Value Ref Range    Total Cholesterol 100 0 - 200 mg/dL    Triglycerides 129 0 - 150 mg/dL    HDL Cholesterol 32 (L) 40 - 60 mg/dL    VLDL Cholesterol 25.8 mg/dL    LDL Cholesterol  42 0 - 100 mg/dL   Microalbumin / Creatinine Urine Ratio - Urine, Clean Catch   Result Value Ref Range    Creatinine, Urine 73.3 Not Estab. mg/dL    Microalbumin,  Urine 9.7 Not Estab. ug/mL    Microalbumin/Creatinine Ratio 13.2 0.0 - 30.0 mg/g creat   Uric Acid   Result Value Ref Range    Uric Acid 5.0 3.4 - 7.0 mg/dL   Vitamin D 25 Hydroxy   Result Value Ref Range    25 Hydroxy, Vitamin D 73.6 30.0 - 100.0 ng/ml   TSH   Result Value Ref Range    TSH 1.130 0.270 - 4.200 mIU/mL   T4, Free   Result Value Ref Range    Free T4 0.90 (L) 0.93 - 1.70 ng/dL   Thyroid Antibodies   Result Value Ref Range    Thyroid Peroxidase Antibody 9 0 - 34 IU/mL    Thyroglobulin Ab <1.0 0.0 - 0.9 IU/mL   T3, Free   Result Value Ref Range    T3, Free 2.9 2.0 - 4.4 pg/mL   Cardiovascular Risk Assessment   Result Value Ref Range    Interpretation Note    Diabetes Patient Education   Result Value Ref Range    PDF Image Not applicable            Assessment:   Dirk was seen today for follow-up.    Diagnoses and all orders for this visit:    Type 2 diabetes mellitus with hyperglycemia, with long-term current use of insulin (CMS/[facility])  -     Empagliflozin-metFORMIN HCl 12.5-1000 MG tablet; Take 12.5 mg by mouth 2 (Two) Times a Day.  -     Insulin Degludec 200 UNIT/ML solution pen-injector; Inject 54 Units under the skin into the appropriate area as directed Daily. Titrate as instructed with max of 100 units daily  -     Fructosamine; Future  -     Comprehensive Metabolic Panel; Future  -     Lipid Panel; Future  -     Hemoglobin A1c; Future  -     Microalbumin / Creatinine Urine Ratio - Urine, Clean Catch; Future  -     Uric Acid; Future  -     Vitamin D 25 Hydroxy; Future  -     TSH; Future  -     Thyroid Antibodies; Future  -     T4, Free; Future  -     T3, Free; Future    Mixed hyperlipidemia  -     rosuvastatin (CRESTOR) 40 MG tablet; Take 1 tablet by mouth Daily.  -     Fructosamine; Future  -     Comprehensive Metabolic Panel; Future  -     Lipid Panel; Future  -     Hemoglobin A1c; Future  -     Microalbumin / Creatinine Urine Ratio - Urine, Clean Catch; Future  -     Uric Acid; Future  -      Vitamin D 25 Hydroxy; Future  -     TSH; Future  -     Thyroid Antibodies; Future  -     T4, Free; Future  -     T3, Free; Future    Hypervitaminosis D   -     Vitamin D 25 Hydroxy; Future    Other orders  -     Liraglutide (VICTOZA) 18 MG/3ML solution pen-injector injection; Inject 1.8 mg under the skin into the appropriate area as directed Daily.          Plan:   In summary/ Medication changes: I met with this patient is metabolically stable and doing well at this time.  Laboratory testing was reviewed dated 2019, discussed with questions and answers completed.  Discussed and formulate a treatment plan with patient, patient verbally stated understood all instructions.    1. Diagnoses and all orders for this visit:     Type 2 diabetes mellitus with hyperglycemia, with long-term current use of insulin (CMS/[facility])- chronic, uncontrolled.  Medication changes were as follows    continue Victoza       Stop Synjardy 1000 twice daily    Start synjardy 12.5/1000mg twice daily    Start Starlix 120 mg 1 before each meal or at meal or immediately after meal within 10 minutes.  If it is lighter meals such as a half a sandwich and a salad take half a tablet if you do not eat you do not take this tablet.    tresiba 74u - incresae 2units every 3 days if fasting is over 110mg/dl        Mixed hyperlipidemia- chronic, stable no medication changes at this time. Refills prescribed. Future labs ordered for upcoming appointment and assessment.        Hypervitaminosis D -chronic, stable no medication changes at this time. Refills prescribed. Future labs ordered for upcoming appointment and assessment.        Instructions:  home blood tests -  Blood glucose testin times daily, that are:  fasting- 1st thing in morning before eating or drinking  before each meal and 1 or 2 hours after meal  bedtime  anytime you feel symptoms of hyperglycemia or hypoglycemia (high or low blood sugars)        Education:  interpretation of lab  results, blood sugar goals, complications of diabetes mellitus, hypoglycemia prevention and treatment, exercise, illness management, self-monitoring of blood glucose skills, nutrition and carbohydrate counting        The total face to face time spent was  25 minutes  with additional education given: 14 minutes (greater than 50% of the total time) was spent with counseling and coordination of care on: SMBG with goals, Side effects profiles with medications, medication use and purposes,     Return in about 3 months (around 11/7/2019), or if symptoms worsen or fail to improve, for Recheck. 3 months with Heather-2 weeks prior for labs 6 months with Dr. Savage-2 weeks prior for labs      Dragon transcription disclaimer     Much of this encounter note is an electronic transcription/translation of spoken language to printed text. The electronic translation of spoken language may permit erroneous, or at times, nonsensical words or phrases to be inadvertently transcribed. Although I have reviewed the note for such errors, some may still exist.   CAD (coronary artery disease) CAD (coronary artery disease) numerical 0-10

## (undated) DEVICE — TUBING, SUCTION, 1/4" X 10', STRAIGHT: Brand: MEDLINE

## (undated) DEVICE — THE TORRENT IRRIGATION SCOPE CONNECTOR IS USED WITH THE TORRENT IRRIGATION TUBING TO PROVIDE IRRIGATION FLUIDS SUCH AS STERILE WATER DURING GASTROINTESTINAL ENDOSCOPIC PROCEDURES WHEN USED IN CONJUNCTION WITH AN IRRIGATION PUMP (OR ELECTROSURGICAL UNIT).: Brand: TORRENT

## (undated) DEVICE — CANNULA,ADULT,SOFT-TOUCH,7'TUBE,UC: Brand: PENDING

## (undated) DEVICE — SINGLE-USE BIOPSY FORCEPS: Brand: RADIAL JAW 4

## (undated) DEVICE — Device: Brand: DEFENDO AIR/WATER/SUCTION AND BIOPSY VALVE